# Patient Record
Sex: FEMALE | Race: WHITE | Employment: UNEMPLOYED | URBAN - NONMETROPOLITAN AREA
[De-identification: names, ages, dates, MRNs, and addresses within clinical notes are randomized per-mention and may not be internally consistent; named-entity substitution may affect disease eponyms.]

---

## 2017-01-05 RX ORDER — FAMOTIDINE 20 MG/1
TABLET, FILM COATED ORAL
Qty: 180 TABLET | Refills: 1 | Status: SHIPPED | OUTPATIENT
Start: 2017-01-05 | End: 2017-04-24 | Stop reason: ALTCHOICE

## 2017-01-11 RX ORDER — DONEPEZIL HYDROCHLORIDE 5 MG/1
5 TABLET, FILM COATED ORAL NIGHTLY
Qty: 90 TABLET | Refills: 1 | Status: SHIPPED | OUTPATIENT
Start: 2017-01-11 | End: 2017-03-03 | Stop reason: SDUPTHER

## 2017-01-16 RX ORDER — MELOXICAM 15 MG/1
15 TABLET ORAL DAILY
Qty: 90 TABLET | Refills: 1 | Status: SHIPPED | OUTPATIENT
Start: 2017-01-16 | End: 2017-04-24 | Stop reason: ALTCHOICE

## 2017-03-01 ENCOUNTER — TELEPHONE (OUTPATIENT)
Dept: FAMILY MEDICINE CLINIC | Age: 82
End: 2017-03-01

## 2017-03-01 RX ORDER — LACTULOSE 10 G/15ML
20 SOLUTION ORAL 3 TIMES DAILY
COMMUNITY
End: 2017-03-01 | Stop reason: SDUPTHER

## 2017-03-01 RX ORDER — LACTULOSE 10 G/15ML
SOLUTION ORAL
Qty: 473 ML | Refills: 1 | Status: ON HOLD | OUTPATIENT
Start: 2017-03-01 | End: 2018-01-02 | Stop reason: HOSPADM

## 2017-03-03 ENCOUNTER — OFFICE VISIT (OUTPATIENT)
Dept: PRIMARY CARE CLINIC | Age: 82
End: 2017-03-03

## 2017-03-03 VITALS
DIASTOLIC BLOOD PRESSURE: 74 MMHG | BODY MASS INDEX: 23.52 KG/M2 | WEIGHT: 128.6 LBS | OXYGEN SATURATION: 98 % | TEMPERATURE: 98.1 F | SYSTOLIC BLOOD PRESSURE: 120 MMHG | HEART RATE: 54 BPM

## 2017-03-03 DIAGNOSIS — R00.1 BRADYCARDIA: ICD-10-CM

## 2017-03-03 DIAGNOSIS — K64.4 RESIDUAL HEMORRHOIDAL SKIN TAGS: ICD-10-CM

## 2017-03-03 DIAGNOSIS — F02.80 LATE ONSET ALZHEIMER'S DISEASE WITHOUT BEHAVIORAL DISTURBANCE (HCC): ICD-10-CM

## 2017-03-03 DIAGNOSIS — K59.01 SLOW TRANSIT CONSTIPATION: Primary | ICD-10-CM

## 2017-03-03 DIAGNOSIS — R41.0 CONFUSION: ICD-10-CM

## 2017-03-03 DIAGNOSIS — G30.1 LATE ONSET ALZHEIMER'S DISEASE WITHOUT BEHAVIORAL DISTURBANCE (HCC): ICD-10-CM

## 2017-03-03 LAB
-: ABNORMAL
AMORPHOUS: ABNORMAL
BACTERIA: ABNORMAL
BILIRUBIN URINE: NEGATIVE
CASTS UA: ABNORMAL /LPF (ref 0–2)
COLOR: ABNORMAL
COMMENT UA: ABNORMAL
CRYSTALS, UA: ABNORMAL /HPF
EPITHELIAL CELLS UA: >100 /HPF (ref 0–5)
GLUCOSE URINE: NEGATIVE
KETONES, URINE: NEGATIVE
LEUKOCYTE ESTERASE, URINE: ABNORMAL
MUCUS: ABNORMAL
NITRITE, URINE: NEGATIVE
OTHER OBSERVATIONS UA: ABNORMAL
PH UA: 5.5 (ref 5–6)
PROTEIN UA: ABNORMAL
RBC UA: ABNORMAL /HPF (ref 0–4)
RENAL EPITHELIAL, UA: ABNORMAL /HPF
SPECIFIC GRAVITY UA: 1.02 (ref 1.01–1.02)
TRICHOMONAS: ABNORMAL
TURBIDITY: ABNORMAL
URINE HGB: ABNORMAL
UROBILINOGEN, URINE: NORMAL
WBC UA: >100 /HPF (ref 0–4)
YEAST: ABNORMAL

## 2017-03-03 PROCEDURE — 1123F ACP DISCUSS/DSCN MKR DOCD: CPT | Performed by: FAMILY MEDICINE

## 2017-03-03 PROCEDURE — 81001 URINALYSIS AUTO W/SCOPE: CPT | Performed by: FAMILY MEDICINE

## 2017-03-03 PROCEDURE — G8420 CALC BMI NORM PARAMETERS: HCPCS | Performed by: FAMILY MEDICINE

## 2017-03-03 PROCEDURE — 1090F PRES/ABSN URINE INCON ASSESS: CPT | Performed by: FAMILY MEDICINE

## 2017-03-03 PROCEDURE — G8484 FLU IMMUNIZE NO ADMIN: HCPCS | Performed by: FAMILY MEDICINE

## 2017-03-03 PROCEDURE — G8598 ASA/ANTIPLAT THER USED: HCPCS | Performed by: FAMILY MEDICINE

## 2017-03-03 PROCEDURE — G8427 DOCREV CUR MEDS BY ELIG CLIN: HCPCS | Performed by: FAMILY MEDICINE

## 2017-03-03 PROCEDURE — 1036F TOBACCO NON-USER: CPT | Performed by: FAMILY MEDICINE

## 2017-03-03 PROCEDURE — 99214 OFFICE O/P EST MOD 30 MIN: CPT | Performed by: FAMILY MEDICINE

## 2017-03-03 PROCEDURE — 4040F PNEUMOC VAC/ADMIN/RCVD: CPT | Performed by: FAMILY MEDICINE

## 2017-03-03 RX ORDER — DONEPEZIL HYDROCHLORIDE 10 MG/1
10 TABLET, FILM COATED ORAL NIGHTLY
Qty: 90 TABLET | Refills: 0
Start: 2017-03-03 | End: 2017-03-07 | Stop reason: SDUPTHER

## 2017-03-03 RX ORDER — SULFAMETHOXAZOLE AND TRIMETHOPRIM 400; 80 MG/1; MG/1
1 TABLET ORAL 2 TIMES DAILY
Qty: 20 TABLET | Refills: 0 | Status: SHIPPED | OUTPATIENT
Start: 2017-03-03 | End: 2017-03-13

## 2017-03-03 ASSESSMENT — ENCOUNTER SYMPTOMS
CONSTIPATION: 1
EYES NEGATIVE: 1
ANAL BLEEDING: 1
ALLERGIC/IMMUNOLOGIC NEGATIVE: 1
RECTAL PAIN: 1
BLOOD IN STOOL: 1
RESPIRATORY NEGATIVE: 1

## 2017-03-07 RX ORDER — DONEPEZIL HYDROCHLORIDE 10 MG/1
10 TABLET, FILM COATED ORAL NIGHTLY
Qty: 90 TABLET | Refills: 1 | Status: SHIPPED | OUTPATIENT
Start: 2017-03-07 | End: 2017-08-29 | Stop reason: SDUPTHER

## 2017-03-21 ENCOUNTER — OFFICE VISIT (OUTPATIENT)
Dept: FAMILY MEDICINE CLINIC | Age: 82
End: 2017-03-21
Payer: MEDICARE

## 2017-03-21 VITALS
HEIGHT: 62 IN | WEIGHT: 124.6 LBS | SYSTOLIC BLOOD PRESSURE: 112 MMHG | DIASTOLIC BLOOD PRESSURE: 50 MMHG | BODY MASS INDEX: 22.93 KG/M2 | OXYGEN SATURATION: 97 % | HEART RATE: 52 BPM

## 2017-03-21 DIAGNOSIS — R11.0 NAUSEATED: ICD-10-CM

## 2017-03-21 DIAGNOSIS — N39.0 URINARY TRACT INFECTION WITHOUT HEMATURIA, SITE UNSPECIFIED: Primary | ICD-10-CM

## 2017-03-21 DIAGNOSIS — D50.9 MICROCYTIC HYPOCHROMIC ANEMIA: ICD-10-CM

## 2017-03-21 DIAGNOSIS — D64.9 ANEMIA, UNSPECIFIED TYPE: Primary | ICD-10-CM

## 2017-03-21 DIAGNOSIS — R53.83 OTHER FATIGUE: ICD-10-CM

## 2017-03-21 LAB
-: ABNORMAL
AMORPHOUS: ABNORMAL
BACTERIA: ABNORMAL
CASTS UA: ABNORMAL /LPF (ref 0–2)
CRYSTALS, UA: ABNORMAL /HPF
EPITHELIAL CELLS UA: ABNORMAL /HPF (ref 0–5)
MUCUS: ABNORMAL
OTHER OBSERVATIONS UA: ABNORMAL
RBC UA: ABNORMAL /HPF (ref 0–4)
RENAL EPITHELIAL, UA: ABNORMAL /HPF
TRICHOMONAS: ABNORMAL
WBC UA: ABNORMAL /HPF (ref 0–4)
YEAST: ABNORMAL

## 2017-03-21 PROCEDURE — 1090F PRES/ABSN URINE INCON ASSESS: CPT | Performed by: NURSE PRACTITIONER

## 2017-03-21 PROCEDURE — G8419 CALC BMI OUT NRM PARAM NOF/U: HCPCS | Performed by: NURSE PRACTITIONER

## 2017-03-21 PROCEDURE — 1036F TOBACCO NON-USER: CPT | Performed by: NURSE PRACTITIONER

## 2017-03-21 PROCEDURE — 1123F ACP DISCUSS/DSCN MKR DOCD: CPT | Performed by: NURSE PRACTITIONER

## 2017-03-21 PROCEDURE — 99214 OFFICE O/P EST MOD 30 MIN: CPT | Performed by: NURSE PRACTITIONER

## 2017-03-21 PROCEDURE — G8427 DOCREV CUR MEDS BY ELIG CLIN: HCPCS | Performed by: NURSE PRACTITIONER

## 2017-03-21 PROCEDURE — G8484 FLU IMMUNIZE NO ADMIN: HCPCS | Performed by: NURSE PRACTITIONER

## 2017-03-21 PROCEDURE — G8598 ASA/ANTIPLAT THER USED: HCPCS | Performed by: NURSE PRACTITIONER

## 2017-03-21 PROCEDURE — 4040F PNEUMOC VAC/ADMIN/RCVD: CPT | Performed by: NURSE PRACTITIONER

## 2017-03-21 RX ORDER — NITROFURANTOIN 25; 75 MG/1; MG/1
100 CAPSULE ORAL 2 TIMES DAILY
Qty: 20 CAPSULE | Refills: 0 | Status: SHIPPED | OUTPATIENT
Start: 2017-03-21 | End: 2017-03-31

## 2017-03-21 RX ORDER — SUCRALFATE ORAL 1 G/10ML
1 SUSPENSION ORAL 4 TIMES DAILY
Qty: 1200 ML | Refills: 3 | Status: SHIPPED | OUTPATIENT
Start: 2017-03-21 | End: 2017-04-24 | Stop reason: ALTCHOICE

## 2017-03-21 ASSESSMENT — ENCOUNTER SYMPTOMS
VOMITING: 0
BLOOD IN STOOL: 0
TROUBLE SWALLOWING: 0
DIARRHEA: 0
SHORTNESS OF BREATH: 0
RESPIRATORY NEGATIVE: 1
CHEST TIGHTNESS: 0
GASTROINTESTINAL NEGATIVE: 1
EYES NEGATIVE: 1
CONSTIPATION: 0
ALLERGIC/IMMUNOLOGIC NEGATIVE: 1
COUGH: 0
NAUSEA: 0
SINUS PRESSURE: 0
ABDOMINAL PAIN: 0

## 2017-03-22 ENCOUNTER — TELEPHONE (OUTPATIENT)
Dept: FAMILY MEDICINE CLINIC | Age: 82
End: 2017-03-22

## 2017-04-24 ENCOUNTER — OFFICE VISIT (OUTPATIENT)
Dept: FAMILY MEDICINE CLINIC | Age: 82
End: 2017-04-24
Payer: MEDICARE

## 2017-04-24 VITALS
HEART RATE: 56 BPM | RESPIRATION RATE: 12 BRPM | SYSTOLIC BLOOD PRESSURE: 116 MMHG | HEIGHT: 62 IN | BODY MASS INDEX: 23.22 KG/M2 | DIASTOLIC BLOOD PRESSURE: 54 MMHG | WEIGHT: 126.2 LBS

## 2017-04-24 DIAGNOSIS — D64.9 ANEMIA, UNSPECIFIED TYPE: Primary | ICD-10-CM

## 2017-04-24 DIAGNOSIS — K57.91 GASTROINTESTINAL HEMORRHAGE ASSOCIATED WITH INTESTINAL DIVERTICULOSIS: ICD-10-CM

## 2017-04-24 DIAGNOSIS — Z23 NEED FOR PNEUMOCOCCAL VACCINATION: ICD-10-CM

## 2017-04-24 DIAGNOSIS — I48.0 PAROXYSMAL ATRIAL FIBRILLATION (HCC): ICD-10-CM

## 2017-04-24 PROCEDURE — 99496 TRANSJ CARE MGMT HIGH F2F 7D: CPT | Performed by: FAMILY MEDICINE

## 2017-04-24 RX ORDER — FERROUS SULFATE 325(65) MG
325 TABLET ORAL 2 TIMES DAILY
COMMUNITY

## 2017-04-24 RX ORDER — PANTOPRAZOLE SODIUM 40 MG/1
40 TABLET, DELAYED RELEASE ORAL DAILY
COMMUNITY
Start: 2017-04-21 | End: 2017-05-25 | Stop reason: SDUPTHER

## 2017-04-24 ASSESSMENT — ENCOUNTER SYMPTOMS
ABDOMINAL PAIN: 1
ANAL BLEEDING: 1
ALLERGIC/IMMUNOLOGIC NEGATIVE: 1
RECTAL PAIN: 0
BLOOD IN STOOL: 1
RESPIRATORY NEGATIVE: 1
EYES NEGATIVE: 1
CONSTIPATION: 1

## 2017-05-09 ENCOUNTER — TELEPHONE (OUTPATIENT)
Dept: FAMILY MEDICINE CLINIC | Age: 82
End: 2017-05-09
Payer: MEDICARE

## 2017-05-09 DIAGNOSIS — I63.9 CEREBROVASCULAR ACCIDENT (CVA), UNSPECIFIED MECHANISM (HCC): ICD-10-CM

## 2017-05-09 DIAGNOSIS — I48.20 CHRONIC ATRIAL FIBRILLATION (HCC): ICD-10-CM

## 2017-05-09 DIAGNOSIS — N28.9 RENAL DISEASE: ICD-10-CM

## 2017-05-09 DIAGNOSIS — I10 ESSENTIAL HYPERTENSION: ICD-10-CM

## 2017-05-09 DIAGNOSIS — E11.9 TYPE 2 DIABETES MELLITUS WITHOUT COMPLICATION, WITHOUT LONG-TERM CURRENT USE OF INSULIN (HCC): ICD-10-CM

## 2017-05-09 DIAGNOSIS — F02.80 LATE ONSET ALZHEIMER'S DISEASE WITHOUT BEHAVIORAL DISTURBANCE (HCC): Primary | ICD-10-CM

## 2017-05-09 DIAGNOSIS — G30.1 LATE ONSET ALZHEIMER'S DISEASE WITHOUT BEHAVIORAL DISTURBANCE (HCC): Primary | ICD-10-CM

## 2017-05-09 DIAGNOSIS — I25.119 CORONARY ARTERY DISEASE INVOLVING NATIVE HEART WITH ANGINA PECTORIS, UNSPECIFIED VESSEL OR LESION TYPE (HCC): ICD-10-CM

## 2017-05-09 PROCEDURE — G0180 MD CERTIFICATION HHA PATIENT: HCPCS | Performed by: FAMILY MEDICINE

## 2017-05-25 RX ORDER — PANTOPRAZOLE SODIUM 40 MG/1
TABLET, DELAYED RELEASE ORAL
Qty: 30 TABLET | Refills: 5 | Status: SHIPPED | OUTPATIENT
Start: 2017-05-25 | End: 2017-11-20 | Stop reason: SDUPTHER

## 2017-06-21 ENCOUNTER — NURSE ONLY (OUTPATIENT)
Dept: LAB | Age: 82
End: 2017-06-21
Payer: MEDICARE

## 2017-06-21 ENCOUNTER — OFFICE VISIT (OUTPATIENT)
Dept: FAMILY MEDICINE CLINIC | Age: 82
End: 2017-06-21
Payer: MEDICARE

## 2017-06-21 VITALS
RESPIRATION RATE: 16 BRPM | HEIGHT: 62 IN | WEIGHT: 122.6 LBS | BODY MASS INDEX: 22.56 KG/M2 | HEART RATE: 48 BPM | SYSTOLIC BLOOD PRESSURE: 132 MMHG | DIASTOLIC BLOOD PRESSURE: 58 MMHG

## 2017-06-21 DIAGNOSIS — N28.9 RENAL DISEASE: ICD-10-CM

## 2017-06-21 DIAGNOSIS — E78.00 PURE HYPERCHOLESTEROLEMIA: ICD-10-CM

## 2017-06-21 DIAGNOSIS — E11.9 TYPE 2 DIABETES MELLITUS WITHOUT COMPLICATION, WITHOUT LONG-TERM CURRENT USE OF INSULIN (HCC): ICD-10-CM

## 2017-06-21 DIAGNOSIS — G30.1 LATE ONSET ALZHEIMER'S DISEASE WITHOUT BEHAVIORAL DISTURBANCE (HCC): ICD-10-CM

## 2017-06-21 DIAGNOSIS — F02.80 LATE ONSET ALZHEIMER'S DISEASE WITHOUT BEHAVIORAL DISTURBANCE (HCC): ICD-10-CM

## 2017-06-21 DIAGNOSIS — K59.04 CHRONIC IDIOPATHIC CONSTIPATION: ICD-10-CM

## 2017-06-21 DIAGNOSIS — I10 ESSENTIAL HYPERTENSION: ICD-10-CM

## 2017-06-21 DIAGNOSIS — Z23 NEED FOR PNEUMOCOCCAL VACCINATION: Primary | ICD-10-CM

## 2017-06-21 DIAGNOSIS — I95.9 HYPOTENSIVE EPISODE: ICD-10-CM

## 2017-06-21 DIAGNOSIS — D64.9 ANEMIA, UNSPECIFIED TYPE: ICD-10-CM

## 2017-06-21 DIAGNOSIS — I25.119 CORONARY ARTERY DISEASE INVOLVING NATIVE HEART WITH ANGINA PECTORIS, UNSPECIFIED VESSEL OR LESION TYPE (HCC): ICD-10-CM

## 2017-06-21 DIAGNOSIS — I48.0 PAROXYSMAL ATRIAL FIBRILLATION (HCC): ICD-10-CM

## 2017-06-21 DIAGNOSIS — R00.1 BRADYCARDIA: ICD-10-CM

## 2017-06-21 DIAGNOSIS — Z23 NEED FOR PNEUMOCOCCAL VACCINATION: ICD-10-CM

## 2017-06-21 PROCEDURE — 99214 OFFICE O/P EST MOD 30 MIN: CPT | Performed by: FAMILY MEDICINE

## 2017-06-21 PROCEDURE — G8420 CALC BMI NORM PARAMETERS: HCPCS | Performed by: FAMILY MEDICINE

## 2017-06-21 PROCEDURE — G0009 ADMIN PNEUMOCOCCAL VACCINE: HCPCS | Performed by: FAMILY MEDICINE

## 2017-06-21 PROCEDURE — G8427 DOCREV CUR MEDS BY ELIG CLIN: HCPCS | Performed by: FAMILY MEDICINE

## 2017-06-21 PROCEDURE — 1036F TOBACCO NON-USER: CPT | Performed by: FAMILY MEDICINE

## 2017-06-21 PROCEDURE — G8598 ASA/ANTIPLAT THER USED: HCPCS | Performed by: FAMILY MEDICINE

## 2017-06-21 PROCEDURE — 4040F PNEUMOC VAC/ADMIN/RCVD: CPT | Performed by: FAMILY MEDICINE

## 2017-06-21 PROCEDURE — 1123F ACP DISCUSS/DSCN MKR DOCD: CPT | Performed by: FAMILY MEDICINE

## 2017-06-21 PROCEDURE — 90732 PPSV23 VACC 2 YRS+ SUBQ/IM: CPT | Performed by: FAMILY MEDICINE

## 2017-06-21 PROCEDURE — 1090F PRES/ABSN URINE INCON ASSESS: CPT | Performed by: FAMILY MEDICINE

## 2017-06-21 RX ORDER — PYRIDOSTIGMINE BROMIDE 60 MG/1
30 TABLET ORAL 2 TIMES DAILY
COMMUNITY
Start: 2017-06-12

## 2017-06-21 ASSESSMENT — PATIENT HEALTH QUESTIONNAIRE - PHQ9
2. FEELING DOWN, DEPRESSED OR HOPELESS: 1
SUM OF ALL RESPONSES TO PHQ QUESTIONS 1-9: 1
SUM OF ALL RESPONSES TO PHQ9 QUESTIONS 1 & 2: 1
1. LITTLE INTEREST OR PLEASURE IN DOING THINGS: 0

## 2017-06-21 ASSESSMENT — ENCOUNTER SYMPTOMS
EYES NEGATIVE: 1
ALLERGIC/IMMUNOLOGIC NEGATIVE: 1
CONSTIPATION: 1
COUGH: 0
RESPIRATORY NEGATIVE: 1

## 2017-07-28 ENCOUNTER — HOSPITAL ENCOUNTER (EMERGENCY)
Age: 82
Discharge: HOME | End: 2017-07-28
Attending: EMERGENCY MEDICINE
Payer: MEDICARE

## 2017-07-28 ENCOUNTER — TELEPHONE (OUTPATIENT)
Dept: PRIMARY CARE CLINIC | Age: 82
End: 2017-07-28

## 2017-07-28 VITALS
WEIGHT: 124 LBS | HEIGHT: 62 IN | DIASTOLIC BLOOD PRESSURE: 74 MMHG | RESPIRATION RATE: 12 BRPM | SYSTOLIC BLOOD PRESSURE: 170 MMHG | OXYGEN SATURATION: 95 % | BODY MASS INDEX: 22.82 KG/M2 | TEMPERATURE: 98.2 F | HEART RATE: 65 BPM

## 2017-07-28 DIAGNOSIS — R53.83 FATIGUE, UNSPECIFIED TYPE: ICD-10-CM

## 2017-07-28 DIAGNOSIS — N30.00 ACUTE CYSTITIS WITHOUT HEMATURIA: Primary | ICD-10-CM

## 2017-07-28 LAB
-: ABNORMAL
AMORPHOUS: ABNORMAL
ANION GAP SERPL CALCULATED.3IONS-SCNC: 11 MMOL/L (ref 9–17)
BACTERIA: ABNORMAL
BILIRUBIN URINE: NEGATIVE
BUN BLDV-MCNC: 16 MG/DL (ref 8–23)
BUN/CREAT BLD: 15 (ref 9–20)
CALCIUM SERPL-MCNC: 9.4 MG/DL (ref 8.6–10.4)
CASTS UA: ABNORMAL /LPF (ref 0–2)
CHLORIDE BLD-SCNC: 105 MMOL/L (ref 98–107)
CO2: 26 MMOL/L (ref 20–31)
COLOR: ABNORMAL
COMMENT UA: ABNORMAL
CREAT SERPL-MCNC: 1.06 MG/DL (ref 0.5–0.9)
CRYSTALS, UA: ABNORMAL /HPF
EPITHELIAL CELLS UA: ABNORMAL /HPF (ref 0–5)
GFR AFRICAN AMERICAN: 59 ML/MIN
GFR NON-AFRICAN AMERICAN: 49 ML/MIN
GFR SERPL CREATININE-BSD FRML MDRD: ABNORMAL ML/MIN/{1.73_M2}
GFR SERPL CREATININE-BSD FRML MDRD: ABNORMAL ML/MIN/{1.73_M2}
GLUCOSE BLD-MCNC: 187 MG/DL (ref 70–99)
GLUCOSE URINE: NEGATIVE
KETONES, URINE: NEGATIVE
LEUKOCYTE ESTERASE, URINE: ABNORMAL
MUCUS: ABNORMAL
NITRITE, URINE: NEGATIVE
OTHER OBSERVATIONS UA: ABNORMAL
PH UA: 6.5 (ref 5–6)
POTASSIUM SERPL-SCNC: 4.2 MMOL/L (ref 3.7–5.3)
PROTEIN UA: ABNORMAL
RBC UA: ABNORMAL /HPF (ref 0–4)
RENAL EPITHELIAL, UA: ABNORMAL /HPF
SODIUM BLD-SCNC: 142 MMOL/L (ref 135–144)
SPECIFIC GRAVITY UA: 1.01 (ref 1.01–1.02)
THYROXINE, FREE: 1.56 NG/DL (ref 0.93–1.7)
TRICHOMONAS: ABNORMAL
TSH SERPL DL<=0.05 MIU/L-ACNC: 0.66 MIU/L (ref 0.3–5)
TURBIDITY: ABNORMAL
URINE HGB: NEGATIVE
UROBILINOGEN, URINE: NORMAL
WBC UA: ABNORMAL /HPF (ref 0–4)
YEAST: ABNORMAL

## 2017-07-28 PROCEDURE — 80048 BASIC METABOLIC PNL TOTAL CA: CPT

## 2017-07-28 PROCEDURE — 84439 ASSAY OF FREE THYROXINE: CPT

## 2017-07-28 PROCEDURE — 99283 EMERGENCY DEPT VISIT LOW MDM: CPT

## 2017-07-28 PROCEDURE — 84443 ASSAY THYROID STIM HORMONE: CPT

## 2017-07-28 PROCEDURE — 36415 COLL VENOUS BLD VENIPUNCTURE: CPT

## 2017-07-28 PROCEDURE — 81001 URINALYSIS AUTO W/SCOPE: CPT

## 2017-07-28 RX ORDER — SULFAMETHOXAZOLE AND TRIMETHOPRIM 800; 160 MG/1; MG/1
1 TABLET ORAL 2 TIMES DAILY
Qty: 20 TABLET | Refills: 0 | Status: SHIPPED | OUTPATIENT
Start: 2017-07-28 | End: 2017-08-07

## 2017-08-01 ENCOUNTER — CARE COORDINATION (OUTPATIENT)
Dept: CARE COORDINATION | Age: 82
End: 2017-08-01

## 2017-08-04 ENCOUNTER — OFFICE VISIT (OUTPATIENT)
Dept: PRIMARY CARE CLINIC | Age: 82
End: 2017-08-04
Payer: MEDICARE

## 2017-08-04 VITALS
OXYGEN SATURATION: 99 % | TEMPERATURE: 98.3 F | DIASTOLIC BLOOD PRESSURE: 64 MMHG | HEART RATE: 54 BPM | SYSTOLIC BLOOD PRESSURE: 112 MMHG

## 2017-08-04 DIAGNOSIS — R30.0 DYSURIA: Primary | ICD-10-CM

## 2017-08-04 DIAGNOSIS — M25.552 PAIN OF LEFT HIP JOINT: ICD-10-CM

## 2017-08-04 DIAGNOSIS — Z91.81 AT HIGH RISK FOR FALLS: ICD-10-CM

## 2017-08-04 DIAGNOSIS — R29.898 WEAKNESS OF BOTH LEGS: ICD-10-CM

## 2017-08-04 DIAGNOSIS — G30.1 LATE ONSET ALZHEIMER'S DISEASE WITHOUT BEHAVIORAL DISTURBANCE (HCC): ICD-10-CM

## 2017-08-04 DIAGNOSIS — F02.80 LATE ONSET ALZHEIMER'S DISEASE WITHOUT BEHAVIORAL DISTURBANCE (HCC): ICD-10-CM

## 2017-08-04 LAB
-: ABNORMAL
AMORPHOUS: ABNORMAL
BACTERIA: ABNORMAL
BILIRUBIN URINE: NEGATIVE
CASTS UA: ABNORMAL /LPF (ref 0–2)
COLOR: ABNORMAL
COMMENT UA: ABNORMAL
CRYSTALS, UA: ABNORMAL /HPF
EPITHELIAL CELLS UA: ABNORMAL /HPF (ref 0–5)
GLUCOSE URINE: NEGATIVE
KETONES, URINE: NEGATIVE
LEUKOCYTE ESTERASE, URINE: ABNORMAL
MUCUS: ABNORMAL
NITRITE, URINE: NEGATIVE
OTHER OBSERVATIONS UA: ABNORMAL
PH UA: 6 (ref 5–6)
PROTEIN UA: NEGATIVE
RBC UA: ABNORMAL /HPF (ref 0–4)
RENAL EPITHELIAL, UA: ABNORMAL /HPF
SPECIFIC GRAVITY UA: 1.03 (ref 1.01–1.02)
TRICHOMONAS: ABNORMAL
TURBIDITY: ABNORMAL
URINE HGB: ABNORMAL
UROBILINOGEN, URINE: NORMAL
WBC UA: ABNORMAL /HPF (ref 0–4)
YEAST: ABNORMAL

## 2017-08-04 PROCEDURE — G8598 ASA/ANTIPLAT THER USED: HCPCS | Performed by: FAMILY MEDICINE

## 2017-08-04 PROCEDURE — 1090F PRES/ABSN URINE INCON ASSESS: CPT | Performed by: FAMILY MEDICINE

## 2017-08-04 PROCEDURE — 81003 URINALYSIS AUTO W/O SCOPE: CPT | Performed by: FAMILY MEDICINE

## 2017-08-04 PROCEDURE — 4040F PNEUMOC VAC/ADMIN/RCVD: CPT | Performed by: FAMILY MEDICINE

## 2017-08-04 PROCEDURE — 1123F ACP DISCUSS/DSCN MKR DOCD: CPT | Performed by: FAMILY MEDICINE

## 2017-08-04 PROCEDURE — 1036F TOBACCO NON-USER: CPT | Performed by: FAMILY MEDICINE

## 2017-08-04 PROCEDURE — 99214 OFFICE O/P EST MOD 30 MIN: CPT | Performed by: FAMILY MEDICINE

## 2017-08-04 PROCEDURE — G8427 DOCREV CUR MEDS BY ELIG CLIN: HCPCS | Performed by: FAMILY MEDICINE

## 2017-08-04 PROCEDURE — G8420 CALC BMI NORM PARAMETERS: HCPCS | Performed by: FAMILY MEDICINE

## 2017-08-04 PROCEDURE — 81001 URINALYSIS AUTO W/SCOPE: CPT | Performed by: FAMILY MEDICINE

## 2017-08-04 ASSESSMENT — ENCOUNTER SYMPTOMS
RESPIRATORY NEGATIVE: 1
ALLERGIC/IMMUNOLOGIC NEGATIVE: 1
CONSTIPATION: 1
EYES NEGATIVE: 1
COUGH: 0

## 2017-08-08 ENCOUNTER — TELEPHONE (OUTPATIENT)
Dept: FAMILY MEDICINE CLINIC | Age: 82
End: 2017-08-08

## 2017-08-09 ENCOUNTER — NURSE ONLY (OUTPATIENT)
Dept: LAB | Age: 82
End: 2017-08-09
Payer: MEDICARE

## 2017-08-09 DIAGNOSIS — N39.0 URINARY TRACT INFECTION WITHOUT HEMATURIA, SITE UNSPECIFIED: Primary | ICD-10-CM

## 2017-08-09 DIAGNOSIS — N39.0 URINARY TRACT INFECTION WITHOUT HEMATURIA, SITE UNSPECIFIED: ICD-10-CM

## 2017-08-09 LAB
-: ABNORMAL
AMORPHOUS: ABNORMAL
BACTERIA: ABNORMAL
BILIRUBIN URINE: NEGATIVE
CASTS UA: ABNORMAL /LPF (ref 0–2)
COLOR: ABNORMAL
COMMENT UA: ABNORMAL
CRYSTALS, UA: >100 /HPF
EPITHELIAL CELLS UA: ABNORMAL /HPF (ref 0–5)
GLUCOSE URINE: NEGATIVE
KETONES, URINE: NEGATIVE
LEUKOCYTE ESTERASE, URINE: ABNORMAL
MUCUS: ABNORMAL
NITRITE, URINE: NEGATIVE
OTHER OBSERVATIONS UA: ABNORMAL
PH UA: 6 (ref 5–6)
PROTEIN UA: NEGATIVE
RBC UA: ABNORMAL /HPF (ref 0–4)
RENAL EPITHELIAL, UA: ABNORMAL /HPF
SPECIFIC GRAVITY UA: 1.01 (ref 1.01–1.02)
TRICHOMONAS: ABNORMAL
TURBIDITY: ABNORMAL
URINE HGB: NEGATIVE
UROBILINOGEN, URINE: NORMAL
WBC UA: ABNORMAL /HPF (ref 0–4)
YEAST: ABNORMAL

## 2017-08-09 PROCEDURE — 81003 URINALYSIS AUTO W/O SCOPE: CPT | Performed by: FAMILY MEDICINE

## 2017-08-09 PROCEDURE — 81001 URINALYSIS AUTO W/SCOPE: CPT | Performed by: FAMILY MEDICINE

## 2017-08-29 RX ORDER — DONEPEZIL HYDROCHLORIDE 10 MG/1
TABLET, FILM COATED ORAL
Qty: 90 TABLET | Refills: 3 | Status: SHIPPED | OUTPATIENT
Start: 2017-08-29

## 2017-09-28 ENCOUNTER — TELEPHONE (OUTPATIENT)
Dept: FAMILY MEDICINE CLINIC | Age: 82
End: 2017-09-28
Payer: MEDICARE

## 2017-09-28 DIAGNOSIS — I63.9 CEREBROVASCULAR ACCIDENT (CVA), UNSPECIFIED MECHANISM (HCC): ICD-10-CM

## 2017-09-28 DIAGNOSIS — G30.1 LATE ONSET ALZHEIMER'S DISEASE WITHOUT BEHAVIORAL DISTURBANCE (HCC): Primary | ICD-10-CM

## 2017-09-28 DIAGNOSIS — N28.9 RENAL DISEASE: ICD-10-CM

## 2017-09-28 DIAGNOSIS — F02.80 LATE ONSET ALZHEIMER'S DISEASE WITHOUT BEHAVIORAL DISTURBANCE (HCC): Primary | ICD-10-CM

## 2017-09-28 DIAGNOSIS — I48.20 CHRONIC ATRIAL FIBRILLATION (HCC): ICD-10-CM

## 2017-09-28 DIAGNOSIS — I25.119 CORONARY ARTERY DISEASE INVOLVING NATIVE CORONARY ARTERY OF NATIVE HEART WITH ANGINA PECTORIS (HCC): ICD-10-CM

## 2017-09-28 PROCEDURE — G0180 MD CERTIFICATION HHA PATIENT: HCPCS | Performed by: FAMILY MEDICINE

## 2017-11-20 RX ORDER — PANTOPRAZOLE SODIUM 40 MG/1
TABLET, DELAYED RELEASE ORAL
Qty: 30 TABLET | Refills: 0 | Status: SHIPPED | OUTPATIENT
Start: 2017-11-20 | End: 2017-12-22 | Stop reason: SDUPTHER

## 2017-11-29 ENCOUNTER — HOSPITAL ENCOUNTER (OUTPATIENT)
Age: 82
Setting detail: SPECIMEN
Discharge: HOME | End: 2017-11-29
Payer: MEDICARE

## 2017-11-29 ENCOUNTER — OFFICE VISIT (OUTPATIENT)
Dept: PRIMARY CARE CLINIC | Age: 82
End: 2017-11-29
Payer: MEDICARE

## 2017-11-29 VITALS
SYSTOLIC BLOOD PRESSURE: 138 MMHG | TEMPERATURE: 97.4 F | DIASTOLIC BLOOD PRESSURE: 84 MMHG | HEIGHT: 62 IN | WEIGHT: 123.9 LBS | HEART RATE: 68 BPM | BODY MASS INDEX: 22.8 KG/M2

## 2017-11-29 DIAGNOSIS — R30.0 DYSURIA: Primary | ICD-10-CM

## 2017-11-29 DIAGNOSIS — N30.00 ACUTE CYSTITIS WITHOUT HEMATURIA: ICD-10-CM

## 2017-11-29 DIAGNOSIS — R30.0 DYSURIA: ICD-10-CM

## 2017-11-29 LAB
-: ABNORMAL
AMORPHOUS: ABNORMAL
BACTERIA: ABNORMAL
BILIRUBIN URINE: NEGATIVE
CASTS UA: >50 /LPF (ref 0–2)
COLOR: ABNORMAL
COMMENT UA: ABNORMAL
CRYSTALS, UA: ABNORMAL /HPF
EPITHELIAL CELLS UA: ABNORMAL /HPF (ref 0–5)
GLUCOSE URINE: ABNORMAL
KETONES, URINE: ABNORMAL
LEUKOCYTE ESTERASE, URINE: ABNORMAL
MUCUS: ABNORMAL
NITRITE, URINE: POSITIVE
OTHER OBSERVATIONS UA: ABNORMAL
PH UA: 5 (ref 5–6)
PROTEIN UA: ABNORMAL
RBC UA: ABNORMAL /HPF (ref 0–4)
RENAL EPITHELIAL, UA: ABNORMAL /HPF
SPECIFIC GRAVITY UA: 1.02 (ref 1.01–1.02)
TRICHOMONAS: ABNORMAL
TURBIDITY: ABNORMAL
URINE HGB: ABNORMAL
UROBILINOGEN, URINE: ABNORMAL
WBC UA: ABNORMAL /HPF (ref 0–4)
YEAST: ABNORMAL

## 2017-11-29 PROCEDURE — 1123F ACP DISCUSS/DSCN MKR DOCD: CPT | Performed by: FAMILY MEDICINE

## 2017-11-29 PROCEDURE — G8598 ASA/ANTIPLAT THER USED: HCPCS | Performed by: FAMILY MEDICINE

## 2017-11-29 PROCEDURE — 87086 URINE CULTURE/COLONY COUNT: CPT

## 2017-11-29 PROCEDURE — G8420 CALC BMI NORM PARAMETERS: HCPCS | Performed by: FAMILY MEDICINE

## 2017-11-29 PROCEDURE — 1090F PRES/ABSN URINE INCON ASSESS: CPT | Performed by: FAMILY MEDICINE

## 2017-11-29 PROCEDURE — 99213 OFFICE O/P EST LOW 20 MIN: CPT | Performed by: FAMILY MEDICINE

## 2017-11-29 PROCEDURE — 81001 URINALYSIS AUTO W/SCOPE: CPT

## 2017-11-29 PROCEDURE — 4040F PNEUMOC VAC/ADMIN/RCVD: CPT | Performed by: FAMILY MEDICINE

## 2017-11-29 PROCEDURE — G8427 DOCREV CUR MEDS BY ELIG CLIN: HCPCS | Performed by: FAMILY MEDICINE

## 2017-11-29 PROCEDURE — 1036F TOBACCO NON-USER: CPT | Performed by: FAMILY MEDICINE

## 2017-11-29 PROCEDURE — G8484 FLU IMMUNIZE NO ADMIN: HCPCS | Performed by: FAMILY MEDICINE

## 2017-11-29 RX ORDER — SULFAMETHOXAZOLE AND TRIMETHOPRIM 400; 80 MG/1; MG/1
1 TABLET ORAL DAILY
Qty: 7 TABLET | Refills: 0 | Status: SHIPPED | OUTPATIENT
Start: 2017-11-29 | End: 2017-12-06

## 2017-11-29 ASSESSMENT — ENCOUNTER SYMPTOMS
NAUSEA: 0
EYES NEGATIVE: 1
CONSTIPATION: 0
ABDOMINAL PAIN: 1
ALLERGIC/IMMUNOLOGIC NEGATIVE: 1
DIARRHEA: 0
BACK PAIN: 0
RESPIRATORY NEGATIVE: 1
VOMITING: 0

## 2017-11-29 NOTE — PROGRESS NOTES
Subjective:      Patient ID: Blair Stephen is a 80 y.o. female. HPI acute urgent care visit for some slight confusion and episode where she became acutely weak today. She was shopping this afternoon with her dtr. She did well , pushing a cart. She was walking in a store and she suddenly \"melted\" per her dtr. Her dtr was able to catch her and help slowly to the floor. They were able to get her on a bench and used a wheelchair to get back to the car. No loc. She seemed to be a little confused earlier in the day, and again around the time of this near fall. She has had 3 prior episodes over the last few years with similar issue with legs suddenly loosing strength. Last episode was during a uti. Similar symptoms. She has no fever. No dysuria. She currently relates right lower abdominal pain \"when she needs to have a bm\", then pain improves after. Feeling ok at present. Seems back to her baseline.     Past Medical History:   Diagnosis Date    Adenocarcinoma ([facility]     history of focal adenocarcinoma atypical hyperplasia     Anemia     Atrophic vaginitis     history of     Chronic atrial fibrillation (HCC)     Coronary artery disease     Dementia     Diabetes mellitus, type 2 (HCC)     Diverticulosis     Fatigue     GERD (gastroesophageal reflux disease)     Heartburn     Herpes zoster     history of     Hypercholesterolemia     Hypertension     Hypotensive episode     history of     Left cataract     Orthostasis     Osteoporosis     Renal disease     chronic stage 3    Stroke ([address]     Vasomotor rhinitis     history of     Vertigo     history of      Past Surgical History:   Procedure Laterality Date    CATARACT REMOVAL WITH IMPLANT Left      SECTION      COLONOSCOPY  2006    CYST REMOVAL  1979    back    DILATION AND CURETTAGE OF UTERUS  1977    MMR    OTHER SURGICAL HISTORY      CABG x 4     OTHER SURGICAL HISTORY  10/24/2012    removal of NORM Final    Nitrite, Urine 11/29/2017 POSITIVE* NEG Final    Leukocyte Esterase, Urine 11/29/2017 1+* NEG Final    Urinalysis Comments 11/29/2017 Results may be affected due to urine color interference. Final    - 11/29/2017        Final    WBC, UA 11/29/2017 10 TO 25  0 - 4 /HPF Final    RBC, UA 11/29/2017 10 TO 25  0 - 4 /HPF Final    Casts UA 11/29/2017 >50  0 - 2 /LPF Final    Crystals UA 11/29/2017 NOT REPORTED  NONE /HPF Final    Epithelial Cells UA 11/29/2017 25 TO 50  0 - 5 /HPF Final    Renal Epithelial, Urine 11/29/2017 NOT REPORTED  0 /HPF Final    Bacteria, UA 11/29/2017 2+* NONE Final    Mucus, UA 11/29/2017 NOT REPORTED  NONE Final    Trichomonas, UA 11/29/2017 NOT REPORTED  NONE Final    Amorphous, UA 11/29/2017 NOT REPORTED  NONE Final    Other Observations UA 11/29/2017 Specimen Cultured* NREQ Final    Yeast, UA 11/29/2017 NOT REPORTED  NONE Final       Assessment:      Acute collapse. Controled fall to the ground without injury. uti recurrent. Similar sequence of events in the past with uti      Plan:      Starting bactrim ss bid x 7 days  Increase fluids  Supervised ambulation and bathroom visits for the next 2 days or so to avoid falls  Call with any concern, fever or other symptoms.

## 2017-12-01 LAB
CULTURE: NORMAL
CULTURE: NORMAL
Lab: NORMAL
SPECIMEN DESCRIPTION: NORMAL
SPECIMEN DESCRIPTION: NORMAL
STATUS: NORMAL

## 2017-12-08 ENCOUNTER — TELEPHONE (OUTPATIENT)
Dept: FAMILY MEDICINE CLINIC | Age: 82
End: 2017-12-08

## 2017-12-08 RX ORDER — MECLIZINE HYDROCHLORIDE 25 MG/1
25 TABLET ORAL 3 TIMES DAILY PRN
Qty: 30 TABLET | Refills: 0 | Status: SHIPPED | OUTPATIENT
Start: 2017-12-08

## 2017-12-13 ENCOUNTER — HOSPITAL ENCOUNTER (OUTPATIENT)
Dept: LAB | Age: 82
Setting detail: SPECIMEN
Discharge: HOME | End: 2017-12-13
Payer: MEDICARE

## 2017-12-13 DIAGNOSIS — E11.9 TYPE 2 DIABETES MELLITUS WITHOUT COMPLICATION, WITHOUT LONG-TERM CURRENT USE OF INSULIN (HCC): ICD-10-CM

## 2017-12-13 DIAGNOSIS — I48.0 PAROXYSMAL ATRIAL FIBRILLATION (HCC): ICD-10-CM

## 2017-12-13 DIAGNOSIS — N30.00 ACUTE CYSTITIS WITHOUT HEMATURIA: ICD-10-CM

## 2017-12-13 DIAGNOSIS — I10 ESSENTIAL HYPERTENSION: ICD-10-CM

## 2017-12-13 LAB
-: ABNORMAL
ABSOLUTE EOS #: 0.22 K/UL (ref 0–0.4)
ABSOLUTE IMMATURE GRANULOCYTE: ABNORMAL K/UL (ref 0–0.3)
ABSOLUTE LYMPH #: 1.75 K/UL (ref 1–4.8)
ABSOLUTE MONO #: 0.51 K/UL (ref 0.1–1.2)
AMORPHOUS: ABNORMAL
ANION GAP SERPL CALCULATED.3IONS-SCNC: 11 MMOL/L (ref 9–17)
BACTERIA: ABNORMAL
BASOPHILS # BLD: 0 % (ref 0–1)
BASOPHILS ABSOLUTE: 0.02 K/UL (ref 0–0.2)
BILIRUBIN URINE: NEGATIVE
BUN BLDV-MCNC: 16 MG/DL (ref 8–23)
BUN/CREAT BLD: 15 (ref 9–20)
CALCIUM SERPL-MCNC: 9.5 MG/DL (ref 8.6–10.4)
CASTS UA: ABNORMAL /LPF (ref 0–2)
CHLORIDE BLD-SCNC: 109 MMOL/L (ref 98–107)
CO2: 25 MMOL/L (ref 20–31)
COLOR: ABNORMAL
COMMENT UA: ABNORMAL
CREAT SERPL-MCNC: 1.05 MG/DL (ref 0.5–0.9)
CREATININE URINE: 160.4 MG/DL (ref 28–217)
CRYSTALS, UA: ABNORMAL /HPF
DIFFERENTIAL TYPE: ABNORMAL
EOSINOPHILS RELATIVE PERCENT: 4 % (ref 1–7)
EPITHELIAL CELLS UA: ABNORMAL /HPF (ref 0–5)
ESTIMATED AVERAGE GLUCOSE: 88 MG/DL
GFR AFRICAN AMERICAN: 60 ML/MIN
GFR NON-AFRICAN AMERICAN: 49 ML/MIN
GFR SERPL CREATININE-BSD FRML MDRD: ABNORMAL ML/MIN/{1.73_M2}
GFR SERPL CREATININE-BSD FRML MDRD: ABNORMAL ML/MIN/{1.73_M2}
GLUCOSE BLD-MCNC: 143 MG/DL (ref 70–99)
GLUCOSE URINE: NEGATIVE
HBA1C MFR BLD: 4.7 % (ref 4.8–5.9)
HCT VFR BLD CALC: 32.8 % (ref 36–46)
HEMOGLOBIN: 10.5 G/DL (ref 12–16)
IMMATURE GRANULOCYTES: ABNORMAL %
KETONES, URINE: NEGATIVE
LEUKOCYTE ESTERASE, URINE: ABNORMAL
LYMPHOCYTES # BLD: 30 % (ref 16–46)
MCH RBC QN AUTO: 30.4 PG (ref 26–34)
MCHC RBC AUTO-ENTMCNC: 32.1 G/DL (ref 31–37)
MCV RBC AUTO: 94.7 FL (ref 80–100)
MICROALBUMIN/CREAT 24H UR: 288 MG/L
MICROALBUMIN/CREAT UR-RTO: 180 MCG/MG CREAT
MONOCYTES # BLD: 9 % (ref 4–11)
MUCUS: ABNORMAL
NITRITE, URINE: NEGATIVE
OTHER OBSERVATIONS UA: ABNORMAL
PDW BLD-RTO: 12 % (ref 11–14.5)
PH UA: 6 (ref 5–6)
PLATELET # BLD: 252 K/UL (ref 140–450)
PLATELET ESTIMATE: ABNORMAL
PMV BLD AUTO: 8.8 FL (ref 6–12)
POTASSIUM SERPL-SCNC: 4 MMOL/L (ref 3.7–5.3)
PROTEIN UA: ABNORMAL
RBC # BLD: 3.46 M/UL (ref 4–5.2)
RBC # BLD: ABNORMAL 10*6/UL
RBC UA: ABNORMAL /HPF (ref 0–4)
RENAL EPITHELIAL, UA: ABNORMAL /HPF
SEG NEUTROPHILS: 57 % (ref 43–77)
SEGMENTED NEUTROPHILS ABSOLUTE COUNT: 3.26 K/UL (ref 1.8–7.7)
SODIUM BLD-SCNC: 145 MMOL/L (ref 135–144)
SPECIFIC GRAVITY UA: 1.02 (ref 1.01–1.02)
THYROXINE, FREE: 1.39 NG/DL (ref 0.93–1.7)
TRICHOMONAS: ABNORMAL
TSH SERPL DL<=0.05 MIU/L-ACNC: 1.5 MIU/L (ref 0.3–5)
TURBIDITY: ABNORMAL
URINE HGB: NEGATIVE
UROBILINOGEN, URINE: NORMAL
WBC # BLD: 5.7 K/UL (ref 3.5–11)
WBC # BLD: ABNORMAL 10*3/UL
WBC UA: ABNORMAL /HPF (ref 0–4)
YEAST: ABNORMAL

## 2017-12-13 PROCEDURE — 84443 ASSAY THYROID STIM HORMONE: CPT

## 2017-12-13 PROCEDURE — 82570 ASSAY OF URINE CREATININE: CPT

## 2017-12-13 PROCEDURE — 82043 UR ALBUMIN QUANTITATIVE: CPT

## 2017-12-13 PROCEDURE — 36415 COLL VENOUS BLD VENIPUNCTURE: CPT

## 2017-12-13 PROCEDURE — 81001 URINALYSIS AUTO W/SCOPE: CPT

## 2017-12-13 PROCEDURE — 80048 BASIC METABOLIC PNL TOTAL CA: CPT

## 2017-12-13 PROCEDURE — 84439 ASSAY OF FREE THYROXINE: CPT

## 2017-12-13 PROCEDURE — 85025 COMPLETE CBC W/AUTO DIFF WBC: CPT

## 2017-12-13 PROCEDURE — 83036 HEMOGLOBIN GLYCOSYLATED A1C: CPT

## 2017-12-21 ENCOUNTER — OFFICE VISIT (OUTPATIENT)
Dept: FAMILY MEDICINE CLINIC | Age: 82
End: 2017-12-21
Payer: MEDICARE

## 2017-12-21 VITALS
SYSTOLIC BLOOD PRESSURE: 122 MMHG | HEIGHT: 62 IN | DIASTOLIC BLOOD PRESSURE: 70 MMHG | WEIGHT: 118 LBS | BODY MASS INDEX: 21.71 KG/M2

## 2017-12-21 DIAGNOSIS — R42 VERTIGO: ICD-10-CM

## 2017-12-21 DIAGNOSIS — E11.9 TYPE 2 DIABETES MELLITUS WITHOUT COMPLICATION, WITHOUT LONG-TERM CURRENT USE OF INSULIN (HCC): ICD-10-CM

## 2017-12-21 DIAGNOSIS — I25.119 CORONARY ARTERY DISEASE INVOLVING NATIVE CORONARY ARTERY OF NATIVE HEART WITH ANGINA PECTORIS (HCC): ICD-10-CM

## 2017-12-21 DIAGNOSIS — E78.00 PURE HYPERCHOLESTEROLEMIA: ICD-10-CM

## 2017-12-21 DIAGNOSIS — E55.9 VITAMIN D DEFICIENCY: ICD-10-CM

## 2017-12-21 DIAGNOSIS — N28.9 RENAL DISEASE: ICD-10-CM

## 2017-12-21 DIAGNOSIS — R53.83 OTHER FATIGUE: Primary | ICD-10-CM

## 2017-12-21 DIAGNOSIS — D64.9 ANEMIA, UNSPECIFIED TYPE: ICD-10-CM

## 2017-12-21 DIAGNOSIS — R00.1 BRADYCARDIA: ICD-10-CM

## 2017-12-21 DIAGNOSIS — I10 ESSENTIAL HYPERTENSION: ICD-10-CM

## 2017-12-21 DIAGNOSIS — N39.0 URINARY TRACT INFECTION WITHOUT HEMATURIA, SITE UNSPECIFIED: ICD-10-CM

## 2017-12-21 DIAGNOSIS — I48.20 CHRONIC ATRIAL FIBRILLATION (HCC): ICD-10-CM

## 2017-12-21 DIAGNOSIS — I25.119 CORONARY ARTERY DISEASE INVOLVING NATIVE HEART WITH ANGINA PECTORIS, UNSPECIFIED VESSEL OR LESION TYPE (HCC): ICD-10-CM

## 2017-12-21 DIAGNOSIS — F03.90 DEMENTIA WITHOUT BEHAVIORAL DISTURBANCE, UNSPECIFIED DEMENTIA TYPE: ICD-10-CM

## 2017-12-21 PROCEDURE — 1090F PRES/ABSN URINE INCON ASSESS: CPT | Performed by: FAMILY MEDICINE

## 2017-12-21 PROCEDURE — 4040F PNEUMOC VAC/ADMIN/RCVD: CPT | Performed by: FAMILY MEDICINE

## 2017-12-21 PROCEDURE — G8420 CALC BMI NORM PARAMETERS: HCPCS | Performed by: FAMILY MEDICINE

## 2017-12-21 PROCEDURE — G8427 DOCREV CUR MEDS BY ELIG CLIN: HCPCS | Performed by: FAMILY MEDICINE

## 2017-12-21 PROCEDURE — G8598 ASA/ANTIPLAT THER USED: HCPCS | Performed by: FAMILY MEDICINE

## 2017-12-21 PROCEDURE — 1036F TOBACCO NON-USER: CPT | Performed by: FAMILY MEDICINE

## 2017-12-21 PROCEDURE — 99214 OFFICE O/P EST MOD 30 MIN: CPT | Performed by: FAMILY MEDICINE

## 2017-12-21 PROCEDURE — 1123F ACP DISCUSS/DSCN MKR DOCD: CPT | Performed by: FAMILY MEDICINE

## 2017-12-21 PROCEDURE — G8484 FLU IMMUNIZE NO ADMIN: HCPCS | Performed by: FAMILY MEDICINE

## 2017-12-21 RX ORDER — SULFAMETHOXAZOLE AND TRIMETHOPRIM 800; 160 MG/1; MG/1
1 TABLET ORAL 2 TIMES DAILY
Qty: 20 TABLET | Refills: 0 | Status: ON HOLD | OUTPATIENT
Start: 2017-12-21 | End: 2017-12-28

## 2017-12-21 ASSESSMENT — ENCOUNTER SYMPTOMS
ALLERGIC/IMMUNOLOGIC NEGATIVE: 1
EYES NEGATIVE: 1
CONSTIPATION: 1
COUGH: 0
RESPIRATORY NEGATIVE: 1

## 2017-12-21 NOTE — PROGRESS NOTES
Subjective:      Patient ID: Sofie Sykes is a 80 y.o. female. Diabetes   Hypoglycemia symptoms include confusion, dizziness and pallor. Associated symptoms include fatigue (improved) and weakness (improved). Hypertension     Hyperlipidemia     Coronary Artery Disease   Symptoms include dizziness. Risk factors include hyperlipidemia. Gastroesophageal Reflux   She reports no coughing. Associated symptoms include fatigue (improved). Routine follow up on chronic medical conditions, refills, and review of updated labs. I have reviewed the patient's medical history in detail and updated the computerized patient record. She has been having continued episodes where she is more anxious and confused. Interval visit for mental status  And acute weakness changes, likely related to uti at the time. She had another bout of vertigo. Calling out in the morning and needing assistance. Poor balance. ED visit for same. CT of the head with no acute changes. bp elevated at that time. Given her doses of hctz and cozaar with reduction in bp towards normal. No interventions needed. She is sleeping more. Cognitive status fairly stable. Still having some vertigo last night acutely again. Hand coordination and strength seem worse at present. No gerd symptoms to report. No chest pain or palpitations. Her pocket where pacer removed is sore at times. Not checking bs at home recently. bp fair at present. Some 150 range at times. Increases with vertigo recurrence.      Past Medical History:   Diagnosis Date    Adenocarcinoma [facility])     history of focal adenocarcinoma atypical hyperplasia     Anemia     Atrophic vaginitis     history of     Chronic atrial fibrillation (HCC)     Coronary artery disease     Dementia     Diabetes mellitus, type 2 (HCC)     Diverticulosis     Fatigue     GERD (gastroesophageal reflux disease)     Heartburn     Herpes zoster     history of     Take 1,000 mg by mouth daily.  Multiple Vitamins-Minerals (MULTIVITAMIN PO) Take  by mouth daily.  Omega-3 Fatty Acids (FISH OIL PO) Take  by mouth daily.  meclizine (ANTIVERT) 25 MG tablet Take 1 tablet by mouth 3 times daily as needed for Dizziness 30 tablet 0     No current facility-administered medications for this visit. Allergies   Allergen Reactions    Other      Patient states \"Alergic to Calcium\"     Reclast [Zoledronic Acid]      Social: living with dtr at present. Review of Systems   Constitutional: Positive for fatigue (improved). HENT: Negative. Negative for nosebleeds. Eyes: Negative. Respiratory: Negative. Negative for cough. Cardiovascular: Negative. Gastrointestinal: Positive for constipation. Genitourinary: Negative. Musculoskeletal: Negative. Skin: Positive for pallor. Allergic/Immunologic: Negative. Neurological: Positive for dizziness and weakness (improved). Hematological: Bruises/bleeds easily. Psychiatric/Behavioral: Positive for confusion. Negative for agitation, behavioral problems and sleep disturbance. Objective:   Physical Exam   Constitutional: She appears well-developed and well-nourished. No distress. HENT:   Head: Normocephalic and atraumatic. Right Ear: External ear normal.   Left Ear: External ear normal.   Mouth/Throat: No oropharyngeal exudate. Eyes: Conjunctivae are normal.   Neck: Neck supple. No thyromegaly present. Cardiovascular: Normal rate, regular rhythm and normal heart sounds. Pulmonary/Chest: Effort normal and breath sounds normal. No respiratory distress. She has no wheezes. Abdominal: Bowel sounds are normal. She exhibits no distension. There is no tenderness. Musculoskeletal: Normal range of motion. She exhibits no edema. Lymphadenopathy:     She has no cervical adenopathy. Neurological: She is alert. Skin: Skin is warm and dry. She is not diaphoretic. No pallor.    Psychiatric: k/uL    Basophils # 0.02 0.0 - 0.2 k/uL   Microalbumin, Ur   Result Value Ref Range    Microalb, Ur 288 (H) <21 mg/L    Creatinine, Ur 160.4 28.0 - 217.0 mg/dL    Microalb/Crt. Ratio 180 (H) <25 mcg/mg creat   UA W/REFLEX CULTURE   Result Value Ref Range    Color, UA NOT REPORTED YEL    Turbidity UA NOT REPORTED CLEAR    Glucose, Ur NEGATIVE NEG    Bilirubin Urine NEGATIVE NEG    Ketones, Urine NEGATIVE NEG    Specific Gravity, UA 1.025 1.010 - 1.025    Urine Hgb NEGATIVE NEG    pH, UA 6.0 5.0 - 6.0    Protein, UA 1+ (A) NEG    Urobilinogen, Urine Normal NORM    Nitrite, Urine NEGATIVE NEG    Leukocyte Esterase, Urine 1+ (A) NEG    Urinalysis Comments NOT REPORTED    Microscopic Urinalysis   Result Value Ref Range    -          WBC, UA 25 TO 50 0 - 4 /HPF    RBC, UA 0 TO 4 0 - 4 /HPF    Casts UA 25 TO 50 0 - 2 /LPF    Crystals UA NOT REPORTED NONE /HPF    Epithelial Cells UA 5 TO 10 0 - 5 /HPF    Renal Epithelial, Urine NOT REPORTED 0 /HPF    Bacteria, UA 2+ (A) NONE    Mucus, UA NOT REPORTED NONE    Trichomonas, UA NOT REPORTED NONE    Amorphous, UA NOT REPORTED NONE    Other Observations UA NOT REPORTED NREQ    Yeast, UA NOT REPORTED NONE       Assessment:      Encounter Diagnoses   Name Primary?  Chronic fatigue;stable. Yes    Type 2 diabetes mellitus without complication, without long-term current use of insulin (HCC):stable     Pure hypercholesterolemia;     Essential hypertension:stable     Dementia without behavioral disturbance, unspecified dementia type;slowly progressive. Paroxysmal atrial fibrillation        Plan:      fatigue and weakness referred to legs;had  improved with hold of statin implying some evidence for drug related issues. Plan to cont. Off statin. Finished PT with perceived benefit. Cont. Wt. Bearing as safe and able. She often feels the legs \"melt\" getting out of the car. Recurrent issues , usually getting out of car. Often tied to uti, which is becoming recurrent. Diabetes: good control at present. Will cont. Dietary control. a1c at 4.9%    Hyperlipidemia: opting to stop statin as above due to side effect concerns. Discussed issues at length. Given her age we are being more conservative and concentrating more on quality of life. Will cont. Off statin going forward. Htn: good control at present. bp elevated in ED during vertigo episode, otherwise fairly normal range at home. Some orthostasis concerns and she was started on mestinon through her cardiologist.  Cont. Same. She has had no syncope or persistent dizziness . Watching for low bp symptoms . Bradycardia: ongoing. Some fatigue complaints but not consistently bradycardic in the past on lopressor. Opting to observe for now on same dose and she will call over the interval with any concerns for low pulse, dizziness, or progressing fatigue. . On lopressor and amiodarone. She previously had a pacer, which was removed as she was not having a lot of atrial fibrillation issues over the past 2 years. None  In place at present. Some advancing dementia suspected. More short term memory issues at Present. Moved to [facility] to live with [facility]. At present. Doing well . No behavioral issues. She seems to be doing well. Some recent exacerbations. Trouble getting dressed, more confused at times. Needing direction. No problematic behaviors. Planning to trial aricept next interval after discussion with her and dt. pSantos Leos. No recurrent episodes over the interval, down from 2-3 episodes a week,  since starting amiodarone. She is unaware of palpitations, but generally feels tired and fatigued when this happens. Will cont eliquis and Lopressor. She is using 12.5mg bid. Option of additional 12.5mg dose for recurrent a. Fib.     CAD: CABG x4 in 3/06. asx at present. Preserved EF. Cont. Current meds and routine cardiology follow up. plavix stopped due to increased bleeding .      CKD: mild.   Serial observation at

## 2017-12-22 RX ORDER — PANTOPRAZOLE SODIUM 40 MG/1
TABLET, DELAYED RELEASE ORAL
Qty: 30 TABLET | Refills: 0 | Status: SHIPPED | OUTPATIENT
Start: 2017-12-22 | End: 2018-01-01 | Stop reason: SDUPTHER

## 2017-12-28 ENCOUNTER — HOSPITAL ENCOUNTER (INPATIENT)
Age: 82
LOS: 5 days | Discharge: NURSING, REHAB OR LONG-TERM CARE | DRG: 312 | End: 2018-01-02
Attending: EMERGENCY MEDICINE | Admitting: FAMILY MEDICINE
Payer: MEDICARE

## 2017-12-28 ENCOUNTER — OFFICE VISIT (OUTPATIENT)
Dept: PRIMARY CARE CLINIC | Age: 82
End: 2017-12-28

## 2017-12-28 ENCOUNTER — APPOINTMENT (OUTPATIENT)
Dept: GENERAL RADIOLOGY | Age: 82
DRG: 312 | End: 2017-12-28
Payer: MEDICARE

## 2017-12-28 ENCOUNTER — HOSPITAL ENCOUNTER (OUTPATIENT)
Age: 82
Setting detail: SPECIMEN
Discharge: HOME | DRG: 312 | End: 2017-12-28
Payer: MEDICARE

## 2017-12-28 ENCOUNTER — APPOINTMENT (OUTPATIENT)
Dept: CT IMAGING | Age: 82
DRG: 312 | End: 2017-12-28
Payer: MEDICARE

## 2017-12-28 VITALS — WEIGHT: 112.4 LBS | BODY MASS INDEX: 20.55 KG/M2

## 2017-12-28 DIAGNOSIS — R53.83 OTHER FATIGUE: ICD-10-CM

## 2017-12-28 DIAGNOSIS — R30.0 DYSURIA: Primary | ICD-10-CM

## 2017-12-28 DIAGNOSIS — R55 NEAR SYNCOPE: Primary | ICD-10-CM

## 2017-12-28 DIAGNOSIS — R30.0 DYSURIA: ICD-10-CM

## 2017-12-28 DIAGNOSIS — J32.0 LEFT MAXILLARY SINUSITIS: ICD-10-CM

## 2017-12-28 LAB
-: ABNORMAL
ABSOLUTE EOS #: 0.2 K/UL (ref 0–0.4)
ABSOLUTE IMMATURE GRANULOCYTE: ABNORMAL K/UL (ref 0–0.3)
ABSOLUTE LYMPH #: 1.8 K/UL (ref 1–4.8)
ABSOLUTE MONO #: 0.6 K/UL (ref 0.1–1.2)
ALBUMIN SERPL-MCNC: 3.7 G/DL (ref 3.5–5.2)
ALBUMIN/GLOBULIN RATIO: 1.5 (ref 1–2.5)
ALP BLD-CCNC: 57 U/L (ref 35–104)
ALT SERPL-CCNC: 12 U/L (ref 5–33)
AMORPHOUS: ABNORMAL
ANION GAP SERPL CALCULATED.3IONS-SCNC: 13 MMOL/L (ref 9–17)
AST SERPL-CCNC: 19 U/L
BACTERIA: ABNORMAL
BASOPHILS # BLD: 1 % (ref 0–1)
BASOPHILS ABSOLUTE: 0.1 K/UL (ref 0–0.2)
BILIRUB SERPL-MCNC: 0.17 MG/DL (ref 0.3–1.2)
BILIRUBIN URINE: NEGATIVE
BUN BLDV-MCNC: 17 MG/DL (ref 8–23)
BUN/CREAT BLD: 15 (ref 9–20)
CALCIUM SERPL-MCNC: 9.8 MG/DL (ref 8.6–10.4)
CASTS UA: ABNORMAL /LPF (ref 0–2)
CHLORIDE BLD-SCNC: 107 MMOL/L (ref 98–107)
CO2: 23 MMOL/L (ref 20–31)
COLOR: ABNORMAL
COMMENT UA: ABNORMAL
CREAT SERPL-MCNC: 1.1 MG/DL (ref 0.5–0.9)
CRYSTALS, UA: ABNORMAL /HPF
DIFFERENTIAL TYPE: ABNORMAL
EKG ATRIAL RATE: 55 BPM
EKG P AXIS: 75 DEGREES
EKG P-R INTERVAL: 194 MS
EKG Q-T INTERVAL: 522 MS
EKG QRS DURATION: 84 MS
EKG QTC CALCULATION (BAZETT): 499 MS
EKG R AXIS: 30 DEGREES
EKG T AXIS: 55 DEGREES
EKG VENTRICULAR RATE: 55 BPM
EOSINOPHILS RELATIVE PERCENT: 4 % (ref 1–7)
EPITHELIAL CELLS UA: ABNORMAL /HPF (ref 0–5)
GFR AFRICAN AMERICAN: 57 ML/MIN
GFR NON-AFRICAN AMERICAN: 47 ML/MIN
GFR SERPL CREATININE-BSD FRML MDRD: ABNORMAL ML/MIN/{1.73_M2}
GFR SERPL CREATININE-BSD FRML MDRD: ABNORMAL ML/MIN/{1.73_M2}
GLUCOSE BLD-MCNC: 85 MG/DL (ref 70–99)
GLUCOSE URINE: NEGATIVE
HCT VFR BLD CALC: 33.1 % (ref 36–46)
HEMOGLOBIN: 10.9 G/DL (ref 12–16)
IMMATURE GRANULOCYTES: ABNORMAL %
INR BLD: 1
KETONES, URINE: NEGATIVE
LEUKOCYTE ESTERASE, URINE: ABNORMAL
LYMPHOCYTES # BLD: 35 % (ref 16–46)
MCH RBC QN AUTO: 30.9 PG (ref 26–34)
MCHC RBC AUTO-ENTMCNC: 32.9 G/DL (ref 31–37)
MCV RBC AUTO: 93.9 FL (ref 80–100)
MONOCYTES # BLD: 12 % (ref 4–11)
MUCUS: ABNORMAL
MYOGLOBIN: 38 NG/ML (ref 25–58)
NITRITE, URINE: NEGATIVE
OTHER OBSERVATIONS UA: ABNORMAL
PDW BLD-RTO: 14.5 % (ref 11–14.5)
PH UA: 5.5 (ref 5–6)
PLATELET # BLD: 163 K/UL (ref 140–450)
PLATELET ESTIMATE: ABNORMAL
PMV BLD AUTO: 8.7 FL (ref 6–12)
POTASSIUM SERPL-SCNC: 5.1 MMOL/L (ref 3.7–5.3)
PROTEIN UA: NEGATIVE
PROTHROMBIN TIME: 10.7 SEC (ref 9.4–11.3)
RBC # BLD: 3.53 M/UL (ref 4–5.2)
RBC # BLD: ABNORMAL 10*6/UL
RBC UA: ABNORMAL /HPF (ref 0–4)
RENAL EPITHELIAL, UA: ABNORMAL /HPF
SEG NEUTROPHILS: 48 % (ref 43–77)
SEGMENTED NEUTROPHILS ABSOLUTE COUNT: 2.5 K/UL (ref 1.8–7.7)
SODIUM BLD-SCNC: 143 MMOL/L (ref 135–144)
SPECIFIC GRAVITY UA: 1.01 (ref 1.01–1.02)
TOTAL PROTEIN: 6.1 G/DL (ref 6.4–8.3)
TRICHOMONAS: ABNORMAL
TROPONIN INTERP: NORMAL
TROPONIN INTERP: NORMAL
TROPONIN T: <0.03 NG/ML
TROPONIN T: <0.03 NG/ML
TSH SERPL DL<=0.05 MIU/L-ACNC: 0.82 MIU/L (ref 0.3–5)
TURBIDITY: ABNORMAL
URINE HGB: NEGATIVE
UROBILINOGEN, URINE: NORMAL
WBC # BLD: 5.1 K/UL (ref 3.5–11)
WBC # BLD: ABNORMAL 10*3/UL
WBC UA: ABNORMAL /HPF (ref 0–4)
YEAST: ABNORMAL

## 2017-12-28 PROCEDURE — 71010 XR CHEST PORTABLE: CPT

## 2017-12-28 PROCEDURE — 36415 COLL VENOUS BLD VENIPUNCTURE: CPT

## 2017-12-28 PROCEDURE — 80053 COMPREHEN METABOLIC PANEL: CPT

## 2017-12-28 PROCEDURE — 93005 ELECTROCARDIOGRAM TRACING: CPT

## 2017-12-28 PROCEDURE — 6360000002 HC RX W HCPCS: Performed by: NURSE PRACTITIONER

## 2017-12-28 PROCEDURE — 85025 COMPLETE CBC W/AUTO DIFF WBC: CPT

## 2017-12-28 PROCEDURE — 2580000003 HC RX 258: Performed by: NURSE PRACTITIONER

## 2017-12-28 PROCEDURE — 2060000000 HC ICU INTERMEDIATE R&B

## 2017-12-28 PROCEDURE — 84443 ASSAY THYROID STIM HORMONE: CPT

## 2017-12-28 PROCEDURE — 6370000000 HC RX 637 (ALT 250 FOR IP): Performed by: NURSE PRACTITIONER

## 2017-12-28 PROCEDURE — 81001 URINALYSIS AUTO W/SCOPE: CPT

## 2017-12-28 PROCEDURE — 70450 CT HEAD/BRAIN W/O DYE: CPT

## 2017-12-28 PROCEDURE — 84484 ASSAY OF TROPONIN QUANT: CPT

## 2017-12-28 PROCEDURE — 83874 ASSAY OF MYOGLOBIN: CPT

## 2017-12-28 PROCEDURE — 85610 PROTHROMBIN TIME: CPT

## 2017-12-28 PROCEDURE — 99285 EMERGENCY DEPT VISIT HI MDM: CPT

## 2017-12-28 PROCEDURE — 94760 N-INVAS EAR/PLS OXIMETRY 1: CPT

## 2017-12-28 RX ORDER — HYDRALAZINE HYDROCHLORIDE 20 MG/ML
5 INJECTION INTRAMUSCULAR; INTRAVENOUS EVERY 6 HOURS PRN
Status: DISCONTINUED | OUTPATIENT
Start: 2017-12-28 | End: 2018-01-02 | Stop reason: HOSPADM

## 2017-12-28 RX ORDER — FERROUS SULFATE 325(65) MG
325 TABLET ORAL 2 TIMES DAILY WITH MEALS
Status: DISCONTINUED | OUTPATIENT
Start: 2017-12-29 | End: 2018-01-02 | Stop reason: HOSPADM

## 2017-12-28 RX ORDER — ONDANSETRON 2 MG/ML
4 INJECTION INTRAMUSCULAR; INTRAVENOUS EVERY 6 HOURS PRN
Status: DISCONTINUED | OUTPATIENT
Start: 2017-12-28 | End: 2018-01-02 | Stop reason: HOSPADM

## 2017-12-28 RX ORDER — DONEPEZIL HYDROCHLORIDE 5 MG/1
10 TABLET, FILM COATED ORAL NIGHTLY
Status: DISCONTINUED | OUTPATIENT
Start: 2017-12-28 | End: 2018-01-02 | Stop reason: HOSPADM

## 2017-12-28 RX ORDER — SODIUM CHLORIDE 0.9 % (FLUSH) 0.9 %
10 SYRINGE (ML) INJECTION EVERY 12 HOURS SCHEDULED
Status: DISCONTINUED | OUTPATIENT
Start: 2017-12-28 | End: 2018-01-02 | Stop reason: HOSPADM

## 2017-12-28 RX ORDER — LOSARTAN POTASSIUM 50 MG/1
50 TABLET ORAL DAILY
Status: DISCONTINUED | OUTPATIENT
Start: 2017-12-29 | End: 2017-12-31

## 2017-12-28 RX ORDER — AMIODARONE HYDROCHLORIDE 200 MG/1
200 TABLET ORAL DAILY
Status: DISCONTINUED | OUTPATIENT
Start: 2017-12-29 | End: 2018-01-02 | Stop reason: HOSPADM

## 2017-12-28 RX ORDER — DOCUSATE SODIUM 100 MG/1
100 CAPSULE, LIQUID FILLED ORAL 2 TIMES DAILY
Status: DISCONTINUED | OUTPATIENT
Start: 2017-12-28 | End: 2018-01-02 | Stop reason: HOSPADM

## 2017-12-28 RX ORDER — PYRIDOSTIGMINE BROMIDE 60 MG/1
30 TABLET ORAL 2 TIMES DAILY
Status: DISCONTINUED | OUTPATIENT
Start: 2017-12-28 | End: 2017-12-28

## 2017-12-28 RX ORDER — SODIUM CHLORIDE 0.9 % (FLUSH) 0.9 %
10 SYRINGE (ML) INJECTION PRN
Status: DISCONTINUED | OUTPATIENT
Start: 2017-12-28 | End: 2018-01-02 | Stop reason: HOSPADM

## 2017-12-28 RX ORDER — LANOLIN ALCOHOL/MO/W.PET/CERES
1000 CREAM (GRAM) TOPICAL DAILY
Status: DISCONTINUED | OUTPATIENT
Start: 2017-12-29 | End: 2018-01-02 | Stop reason: HOSPADM

## 2017-12-28 RX ORDER — SULFAMETHOXAZOLE AND TRIMETHOPRIM 800; 160 MG/1; MG/1
1 TABLET ORAL EVERY 12 HOURS SCHEDULED
Status: DISCONTINUED | OUTPATIENT
Start: 2017-12-28 | End: 2017-12-29

## 2017-12-28 RX ORDER — ACETAMINOPHEN 325 MG/1
650 TABLET ORAL EVERY 4 HOURS PRN
Status: DISCONTINUED | OUTPATIENT
Start: 2017-12-28 | End: 2018-01-02 | Stop reason: HOSPADM

## 2017-12-28 RX ORDER — MECLIZINE HCL 12.5 MG/1
25 TABLET ORAL EVERY 6 HOURS SCHEDULED
Status: DISCONTINUED | OUTPATIENT
Start: 2017-12-29 | End: 2018-01-02 | Stop reason: HOSPADM

## 2017-12-28 RX ORDER — PANTOPRAZOLE SODIUM 40 MG/1
40 TABLET, DELAYED RELEASE ORAL NIGHTLY
Status: DISCONTINUED | OUTPATIENT
Start: 2017-12-28 | End: 2018-01-02 | Stop reason: HOSPADM

## 2017-12-28 RX ADMIN — Medication 10 ML: at 22:38

## 2017-12-28 RX ADMIN — PANTOPRAZOLE SODIUM 40 MG: 40 TABLET, DELAYED RELEASE ORAL at 22:38

## 2017-12-28 RX ADMIN — DOCUSATE SODIUM 100 MG: 100 CAPSULE, LIQUID FILLED ORAL at 22:38

## 2017-12-28 RX ADMIN — HYDRALAZINE HYDROCHLORIDE 5 MG: 20 INJECTION INTRAMUSCULAR; INTRAVENOUS at 22:38

## 2017-12-28 RX ADMIN — MECLIZINE 25 MG: 12.5 TABLET ORAL at 23:48

## 2017-12-28 RX ADMIN — SULFAMETHOXAZOLE AND TRIMETHOPRIM 1 TABLET: 800; 160 TABLET ORAL at 22:38

## 2017-12-28 RX ADMIN — DONEPEZIL HYDROCHLORIDE 10 MG: 5 TABLET, FILM COATED ORAL at 22:37

## 2017-12-28 ASSESSMENT — ENCOUNTER SYMPTOMS
SHORTNESS OF BREATH: 0
EYE PAIN: 0
ABDOMINAL PAIN: 0
VOMITING: 0
BLOOD IN STOOL: 0
DIARRHEA: 0
CONSTIPATION: 0
COUGH: 0
BACK PAIN: 0
NAUSEA: 0

## 2017-12-28 ASSESSMENT — PAIN SCALES - GENERAL
PAINLEVEL_OUTOF10: 0
PAINLEVEL_OUTOF10: 0

## 2017-12-28 NOTE — ED PROVIDER NOTES
by mouth 2 times daily    FIBER PO    Take by mouth    GLUCOSE BLOOD (BLOOD GLUCOSE TEST STRIPS) STRP    Test blood glucose levels once daily and as needed for hyper/hypoglycemic symptoms. Dx. Diabetes (250.00)    LACTULOSE (CHRONULAC) 10 GM/15ML SOLUTION    15-30 ml po QHS    LOSARTAN (COZAAR) 50 MG TABLET    Take 50 mg by mouth daily    MECLIZINE (ANTIVERT) 25 MG TABLET    Take 1 tablet by mouth 3 times daily as needed for Dizziness    METOPROLOL TARTRATE (LOPRESSOR) 25 MG TABLET    1/2 tab po BID    MULTIPLE VITAMINS-MINERALS (MULTIVITAMIN PO)    Take  by mouth daily. OMEGA-3 FATTY ACIDS (FISH OIL PO)    Take  by mouth daily. PANTOPRAZOLE (PROTONIX) 40 MG TABLET    TAKE 1 TABLET BY MOUTH EVERY DAY    PYRIDOSTIGMINE (MESTINON) 60 MG TABLET    Take 30 mg by mouth 2 times daily     SULFAMETHOXAZOLE-TRIMETHOPRIM (BACTRIM DS) 800-160 MG PER TABLET    Take 1 tablet by mouth 2 times daily for 10 days Take with food. TRUEPLUS LANCETS 33G MISC           ALLERGIES     is allergic to other and reclast [zoledronic acid]. FAMILY HISTORY     indicated that her mother is . She indicated that her father is . She indicated that her sister is alive. She indicated that only one of her two brothers is alive. She indicated that her maternal grandmother is . She indicated that her maternal grandfather is . She indicated that her paternal grandmother is . She indicated that her paternal grandfather is . She indicated that her daughter is alive. She indicated that her son is . family history includes Alzheimer's Disease in her father; Arthritis in her daughter; Coronary Art Dis in her brother; Depression in her sister; Early Death in her brother; Heart Attack in her mother; Heart Disease in her mother; Osteoporosis in her sister; Other in her son; Thyroid Disease in her sister. SOCIAL HISTORY      reports that she has never smoked.  She has never used smokeless syncope    2. Other fatigue    3. Left maxillary sinusitis          DISPOSITION/PLAN   DISPOSITION     Condition on Disposition    Stable    PATIENT REFERRED TO:  No follow-up provider specified. DISCHARGE MEDICATIONS:  New Prescriptions    No medications on file       (Please note that portions of this note were completed with a voice recognition program.  Efforts were made to edit the dictations but occasionally words are mis-transcribed.)    Snell MD, F.A.A.E.M.   Attending Emergency Physician                           Demetria Ruiz MD  12/28/17 2005

## 2017-12-28 NOTE — ED NOTES
Writer called [facility] regarding patient admission to facility. Writer is advised the intake person is not present but is given a cell phone number for her, Mercedes Click @ [phone]. Writer called this number, no answer, message left.      Paul Kc RN  12/28/17 9728

## 2017-12-28 NOTE — ED NOTES
Writer spoke with Yuval Huggins from [facility]. She states she has been in touch with patient's family prior to their ED visit. She states she has told them they have three options for ECF admission payment, self pay, skilled/medicare, or medicaid. She states they family advised her they cannot afford to do self pay. She states patient does not qualify for skilled/Medicare admission. She states family told her they think patient has Medicaid but they are not certain and cannot produce a medicaid number. She states she had told them to contact [employer] to check on Medicaid status. She states she thinks the best thing at this point would be for patient to go home for another night and handle the payer issue in the morning. She states they cannot accept patient for ECF admission without a payer source.      Salvatore Holm RN  12/28/17 1102

## 2017-12-29 LAB
ABSOLUTE EOS #: 0.2 K/UL (ref 0–0.4)
ABSOLUTE IMMATURE GRANULOCYTE: ABNORMAL K/UL (ref 0–0.3)
ABSOLUTE LYMPH #: 2 K/UL (ref 1–4.8)
ABSOLUTE MONO #: 0.6 K/UL (ref 0.1–1.2)
ANION GAP SERPL CALCULATED.3IONS-SCNC: 11 MMOL/L (ref 9–17)
BASOPHILS # BLD: 1 % (ref 0–1)
BASOPHILS ABSOLUTE: 0 K/UL (ref 0–0.2)
BUN BLDV-MCNC: 15 MG/DL (ref 8–23)
BUN/CREAT BLD: 14 (ref 9–20)
CALCIUM SERPL-MCNC: 9.5 MG/DL (ref 8.6–10.4)
CHLORIDE BLD-SCNC: 109 MMOL/L (ref 98–107)
CO2: 23 MMOL/L (ref 20–31)
CREAT SERPL-MCNC: 1.07 MG/DL (ref 0.5–0.9)
DIFFERENTIAL TYPE: ABNORMAL
EKG ATRIAL RATE: 57 BPM
EKG P AXIS: 70 DEGREES
EKG P-R INTERVAL: 196 MS
EKG Q-T INTERVAL: 502 MS
EKG QRS DURATION: 80 MS
EKG QTC CALCULATION (BAZETT): 488 MS
EKG R AXIS: 47 DEGREES
EKG T AXIS: 74 DEGREES
EKG VENTRICULAR RATE: 57 BPM
EOSINOPHILS RELATIVE PERCENT: 4 % (ref 1–7)
GFR AFRICAN AMERICAN: 58 ML/MIN
GFR NON-AFRICAN AMERICAN: 48 ML/MIN
GFR SERPL CREATININE-BSD FRML MDRD: ABNORMAL ML/MIN/{1.73_M2}
GFR SERPL CREATININE-BSD FRML MDRD: ABNORMAL ML/MIN/{1.73_M2}
GLUCOSE BLD-MCNC: 108 MG/DL (ref 65–105)
GLUCOSE BLD-MCNC: 75 MG/DL (ref 70–99)
HCT VFR BLD CALC: 30.1 % (ref 36–46)
HEMOGLOBIN: 9.9 G/DL (ref 12–16)
IMMATURE GRANULOCYTES: ABNORMAL %
LYMPHOCYTES # BLD: 34 % (ref 16–46)
MCH RBC QN AUTO: 30.9 PG (ref 26–34)
MCHC RBC AUTO-ENTMCNC: 32.9 G/DL (ref 31–37)
MCV RBC AUTO: 93.8 FL (ref 80–100)
MONOCYTES # BLD: 11 % (ref 4–11)
PDW BLD-RTO: 14.5 % (ref 11–14.5)
PLATELET # BLD: 161 K/UL (ref 140–450)
PLATELET ESTIMATE: ABNORMAL
PMV BLD AUTO: 9.2 FL (ref 6–12)
POTASSIUM SERPL-SCNC: 4.7 MMOL/L (ref 3.7–5.3)
RBC # BLD: 3.21 M/UL (ref 4–5.2)
RBC # BLD: ABNORMAL 10*6/UL
SEG NEUTROPHILS: 50 % (ref 43–77)
SEGMENTED NEUTROPHILS ABSOLUTE COUNT: 2.9 K/UL (ref 1.8–7.7)
SODIUM BLD-SCNC: 143 MMOL/L (ref 135–144)
TROPONIN INTERP: NORMAL
TROPONIN T: <0.03 NG/ML
WBC # BLD: 5.8 K/UL (ref 3.5–11)
WBC # BLD: ABNORMAL 10*3/UL

## 2017-12-29 PROCEDURE — 85025 COMPLETE CBC W/AUTO DIFF WBC: CPT

## 2017-12-29 PROCEDURE — 6370000000 HC RX 637 (ALT 250 FOR IP): Performed by: NURSE PRACTITIONER

## 2017-12-29 PROCEDURE — 2580000003 HC RX 258: Performed by: NURSE PRACTITIONER

## 2017-12-29 PROCEDURE — 6370000000 HC RX 637 (ALT 250 FOR IP): Performed by: FAMILY MEDICINE

## 2017-12-29 PROCEDURE — 93005 ELECTROCARDIOGRAM TRACING: CPT

## 2017-12-29 PROCEDURE — 80048 BASIC METABOLIC PNL TOTAL CA: CPT

## 2017-12-29 PROCEDURE — 2060000000 HC ICU INTERMEDIATE R&B

## 2017-12-29 PROCEDURE — 94760 N-INVAS EAR/PLS OXIMETRY 1: CPT

## 2017-12-29 PROCEDURE — 36415 COLL VENOUS BLD VENIPUNCTURE: CPT

## 2017-12-29 PROCEDURE — 6360000002 HC RX W HCPCS: Performed by: FAMILY MEDICINE

## 2017-12-29 PROCEDURE — 97112 NEUROMUSCULAR REEDUCATION: CPT

## 2017-12-29 PROCEDURE — G8981 BODY POS CURRENT STATUS: HCPCS

## 2017-12-29 PROCEDURE — 97162 PT EVAL MOD COMPLEX 30 MIN: CPT

## 2017-12-29 PROCEDURE — 84484 ASSAY OF TROPONIN QUANT: CPT

## 2017-12-29 PROCEDURE — 82947 ASSAY GLUCOSE BLOOD QUANT: CPT

## 2017-12-29 PROCEDURE — 99232 SBSQ HOSP IP/OBS MODERATE 35: CPT | Performed by: FAMILY MEDICINE

## 2017-12-29 PROCEDURE — G8982 BODY POS GOAL STATUS: HCPCS

## 2017-12-29 RX ORDER — FLUCONAZOLE 100 MG/1
200 TABLET ORAL DAILY
Status: DISCONTINUED | OUTPATIENT
Start: 2017-12-29 | End: 2017-12-29

## 2017-12-29 RX ORDER — CIPROFLOXACIN 2 MG/ML
200 INJECTION, SOLUTION INTRAVENOUS EVERY 12 HOURS
Status: DISCONTINUED | OUTPATIENT
Start: 2017-12-29 | End: 2018-01-02 | Stop reason: HOSPADM

## 2017-12-29 RX ORDER — LACTOBACILLUS RHAMNOSUS GG 10B CELL
1 CAPSULE ORAL 3 TIMES DAILY
Status: DISCONTINUED | OUTPATIENT
Start: 2017-12-29 | End: 2018-01-02 | Stop reason: HOSPADM

## 2017-12-29 RX ADMIN — DONEPEZIL HYDROCHLORIDE 10 MG: 5 TABLET, FILM COATED ORAL at 20:02

## 2017-12-29 RX ADMIN — NYSTATIN 500000 UNITS: 100000 SUSPENSION ORAL at 00:12

## 2017-12-29 RX ADMIN — NYSTATIN 500000 UNITS: 100000 SUSPENSION ORAL at 12:12

## 2017-12-29 RX ADMIN — Medication 10 ML: at 07:59

## 2017-12-29 RX ADMIN — Medication 10 ML: at 20:02

## 2017-12-29 RX ADMIN — PSYLLIUM HUSK 0.52 G: 20 CAPSULE ORAL at 07:58

## 2017-12-29 RX ADMIN — PANTOPRAZOLE SODIUM 40 MG: 40 TABLET, DELAYED RELEASE ORAL at 20:02

## 2017-12-29 RX ADMIN — AMIODARONE HYDROCHLORIDE 200 MG: 200 TABLET ORAL at 07:59

## 2017-12-29 RX ADMIN — Medication 1 CAPSULE: at 20:02

## 2017-12-29 RX ADMIN — FERROUS SULFATE TAB 325 MG (65 MG ELEMENTAL FE) 325 MG: 325 (65 FE) TAB at 16:19

## 2017-12-29 RX ADMIN — DOCUSATE SODIUM 100 MG: 100 CAPSULE, LIQUID FILLED ORAL at 20:02

## 2017-12-29 RX ADMIN — LOSARTAN POTASSIUM 50 MG: 50 TABLET ORAL at 07:58

## 2017-12-29 RX ADMIN — CYANOCOBALAMIN TAB 1000 MCG 1000 MCG: 1000 TAB at 07:59

## 2017-12-29 RX ADMIN — MECLIZINE 25 MG: 12.5 TABLET ORAL at 05:36

## 2017-12-29 RX ADMIN — MECLIZINE 25 MG: 12.5 TABLET ORAL at 23:55

## 2017-12-29 RX ADMIN — CIPROFLOXACIN 200 MG: 2 INJECTION INTRAVENOUS at 14:52

## 2017-12-29 RX ADMIN — MECLIZINE 25 MG: 12.5 TABLET ORAL at 17:51

## 2017-12-29 RX ADMIN — NYSTATIN 500000 UNITS: 100000 SUSPENSION ORAL at 07:59

## 2017-12-29 RX ADMIN — Medication 1 CAPSULE: at 16:19

## 2017-12-29 RX ADMIN — NYSTATIN 500000 UNITS: 100000 SUSPENSION ORAL at 16:19

## 2017-12-29 RX ADMIN — NYSTATIN 500000 UNITS: 100000 SUSPENSION ORAL at 20:02

## 2017-12-29 RX ADMIN — FERROUS SULFATE TAB 325 MG (65 MG ELEMENTAL FE) 325 MG: 325 (65 FE) TAB at 07:58

## 2017-12-29 RX ADMIN — DOCUSATE SODIUM 100 MG: 100 CAPSULE, LIQUID FILLED ORAL at 07:58

## 2017-12-29 RX ADMIN — MECLIZINE 25 MG: 12.5 TABLET ORAL at 12:12

## 2017-12-29 RX ADMIN — SULFAMETHOXAZOLE AND TRIMETHOPRIM 1 TABLET: 800; 160 TABLET ORAL at 07:59

## 2017-12-29 ASSESSMENT — PAIN SCALES - GENERAL
PAINLEVEL_OUTOF10: 0

## 2017-12-29 NOTE — ED NOTES
Writer spoke with patient and her family regarding writer's conversation with Rosita Glover. They verbalize understanding stating they spoke with her as well and she told them she will call [employer] right away in the morning to get the Medicaid process started. Family does not feel safe taking patient home tonight. Writer discussed with them our limitations regarding hospital admission. Writer reviewed lab results with them. Writer also advised them Dr. Estela Hilliard will be coming in yet to speak with them regarding test results and plan for patient. They verbalize understanding. Patient is pulled up in bed and positioned for comfort. Side rails are up x2, needs denied.      Love Jacinto, RN  12/28/17 9343

## 2017-12-29 NOTE — H&P
HOSPITALIST ADMISSION H&P      REASON FOR ADMISSION:  Near syncope, hypertension, vertigo  ESTIMATED LENGTH OF STAY:>2mn, 3 days    ATTENDING/ADMITTING PHYSICIAN: Christian Ring MD/Brandy Rankin NP-C, FNP-BC  PCP: Avinash Hernandez MD    HISTORY OF PRESENT ILLNESS:      The patient is a 80 y.o. female patient of Avinash Hernandez MD who presents from the ER with c/o extreme generalized weakness, recurring and gradually worsening near-syncopal events, increasing confusion, recurring chronic vertigo, and uncontrolled hypertension. She is currently living with her daughter. She had a syncopal episode on 12/25/2018 while in the shower, but did not seek medical treatment at that time. She ambulated with a walker up until 2 weeks ago, but has been mostly wheelchair bound over the past 2 weeks due to her vertigo and weakness, per the daughter's report. Her daughter reports occasional visual hallucinations/delirium that began today. She has been treated with PO antibiotics for a recurring/resistant UTI over the past 4 weeks with PO Augmentin and then Bactrim. Repeat UA today shows improvement of the UTI and she has 6 doses of the Bactrim remaining in the prescribed 10 day course. Head CT in ER Impression:  No acute intracranial abnormality. High attenuation material left maxillary sinus suggesting sinusitis. The high attenuation suggests the possibility of a fungal process. Her daughter describes chronic rhinitis, and states that nasal drainage has been clear. She has had a previous CVA with residual right peripheral visual field cut. She has been taken off anticoagulation due to severe diverticular disease with chronic bleeding. In talking with the daughter, she states she has been trying to seek SNF placement (at the [facility]) for her mother since she is requiring assistance with bathing, dressing, grooming, feeding, toileting, and medication administration.  The daughter states she does not feel she can system/connective tissue disease, hematologic, neuropsych, skin, lymphatics, or malignancies. Code status discussed with patient/family--wishes for DNR-CCA at this time. PHYSICAL EXAM:  Vitals:  BP (!) 186/85   Pulse 58   Temp 98.2 °F (36.8 °C) (Tympanic)   Resp 16   Ht 5' 2\" (1.575 m)   Wt 110 lb (49.9 kg)   SpO2 94%   BMI 20.12 kg/m²     General: awake, alert and cooperative  HEENT: mild left maxillary tenderness elicited with palpation, TMs translucent without errythema bilaterally, pharynx without erythema or exudate, Mucosa Pink, Moist, Normocephalic and Atraumatic--coating on tongue noted  Neck: Supple, Carotid Pulses Present, No Masses, Tenderness, Nodularity and No Lymphadenopathy  Chest/Lungs: diminished bases bilaterally and Clear to Auscultation without Rales, Rhonchi, or Wheezes  Cardiac: regular rhythm and bradycardic rate in the 50's  GI/Abdomen:  Bowel Sounds Present, Soft, Non-tender, without Guarding or Rebound Tenderness and No Masses  : Not examined  EXT/Skin: No Edema, No Cyanosis and No Clubbing  Neuro: awake and alert, generalized weakness, [place], previous CVA with right visual field cut at baseline, gait abnormality at baseline (uses walker at veers to her right at baseline per daughter's report) and Dementia at baseline      LABS:    CBC with Differential:    Lab Results   Component Value Date    WBC 5.1 12/28/2017    RBC 3.53 12/28/2017    HGB 10.9 12/28/2017    HCT 33.1 12/28/2017     12/28/2017    MCV 93.9 12/28/2017    MCH 30.9 12/28/2017    MCHC 32.9 12/28/2017    RDW 14.5 12/28/2017    LYMPHOPCT 35 12/28/2017    MONOPCT 12 12/28/2017    BASOPCT 1 12/28/2017    MONOSABS 0.60 12/28/2017    LYMPHSABS 1.80 12/28/2017    EOSABS 0.20 12/28/2017    BASOSABS 0.10 12/28/2017    DIFFTYPE NOT REPORTED 12/28/2017     CMP:    Lab Results   Component Value Date     12/28/2017    K 5.1 12/28/2017     12/28/2017    CO2 23 12/28/2017    BUN 17 12/28/2017    CREATININE

## 2017-12-29 NOTE — PROGRESS NOTES
Reason for Exam: Altered mental status; weight loss; decreased appetite; hx of stroke; hx of quadruple bypass Acuity: Acute Type of Exam: Initial FINDINGS: Status post median sternotomy. The heart size is stable. Linear atelectatic changes in the right upper lobe. No other airspace disease. No pleural effusion. No pulmonary edema. Stable chest demonstrating linear atelectatic changes or scarring in the right upper lobe       Assessment :   1. uti  2. A fib/rate controled with amidarone  3. Sinus ?likely from fungal.still will hold diflucan due to QT prolongation  4. Change rx from bactrim to iv cipro and plan for d/c to rehab on Sunday  5. Age related physical debility in setting of weakness ad falls being rx with ECR placement and assistance with ADL     Plan:   1. Therapy to see  2. See order    Patient Active Problem List:     Hypertension     Coronary artery disease involving native coronary artery of native heart     Anemia     Chronic atrial fibrillation (HCC)     Hypotensive episode     Vertigo     Orthostasis     Vasomotor rhinitis     Diverticulosis     GERD (gastroesophageal reflux disease)     Heartburn     Diabetes mellitus, type 2 (HCC)     Stroke ([facility]     Renal disease     Atrophic vaginitis     Osteoporosis     Dementia     Near syncope      Anticipated Disposition upon discharge: [] Home                                                                         [] Home with Home Health                                                                         [] [facility]                                                                         [] [address]      Patient is admitted as inpatient status because of co-morbidities listed above, severity of signs and symptoms as outlined, requirement for current medical therapies and most importantly because of direct risk to patient if care not provided in a hospital setting.           Muriel Read,

## 2017-12-29 NOTE — PROGRESS NOTES
Nutrition Assessment    Type and Reason for Visit: Initial    Nutrition Recommendations: Recommend starting ONS for increased calorie and protein consumption. Will continue to monitor patient and suggest nutrient dense foods. Malnutrition Assessment:  · Malnutrition Status: Meets the criteria for moderate malnutrition  · Context:    · Findings of the 6 clinical characteristics of malnutrition (Minimum of 2 out of 6 clinical characteristics is required to make the diagnosis of moderate or severe Protein Calorie Malnutrition based on AND/ASPEN Guidelines):  1. Energy Intake-51% to 75%,      2. Weight Loss-Unable to assess,    3. Muscle Loss- , Temples (temporalis muscle), Clavicles (pectoralis and deltoids)    Nutrition Diagnosis:   · Problem: Inadequate oral intake  · Etiology: related to Renal dysfunction, Cognitive or neurological impairment     Signs and symptoms:  as evidenced by Intake 25-50%    Nutrition Assessment:  · Subjective Assessment: Pt. referred to nutrition due to unintended weight loss and poor intake/poor apetite. Pt has agreed to start a nutrition supplement 3x per day in addition to meals. · Current Nutrition Therapies:  · Oral Diet Orders: 2gm Sodium, Cardiac   · Oral Diet intake: 26-50%  · Oral Nutrition Supplement (ONS) Orders:  Start chocolate ensure 3x daily  · ONS intake: 26-50%  · Anthropometric Measures:  · Ht: 5' 2\" (157.5 cm)   · Current Body Wt: 110 lb (49.9 kg)  · Ideal Body Wt: 110 lb (49.9 kg), % Ideal Body 100%  · BMI Classification: BMI 18.5 - 24.9 Normal Weight  · Comparative Standards (Estimated Nutrition Needs):  · Estimated Daily Total Kcal: 2596-1235  · Estimated Daily Protein (g): 60-70    Estimated Intake vs Estimated Needs: Intake Less Than Needs    Nutrition Risk Level:  Moderate    Nutrition Interventions:   Continue current diet, Start ONS  Continued Inpatient Monitoring    Nutrition Evaluation:   · Evaluation: Goals set   · Goals: Consume oral supplement 2x

## 2017-12-30 LAB
ABSOLUTE EOS #: 0.2 K/UL (ref 0–0.4)
ABSOLUTE IMMATURE GRANULOCYTE: ABNORMAL K/UL (ref 0–0.3)
ABSOLUTE LYMPH #: 2.4 K/UL (ref 1–4.8)
ABSOLUTE MONO #: 0.7 K/UL (ref 0.1–1.2)
ANION GAP SERPL CALCULATED.3IONS-SCNC: 11 MMOL/L (ref 9–17)
BASOPHILS # BLD: 1 % (ref 0–1)
BASOPHILS ABSOLUTE: 0.1 K/UL (ref 0–0.2)
BUN BLDV-MCNC: 21 MG/DL (ref 8–23)
BUN/CREAT BLD: 14 (ref 9–20)
CALCIUM SERPL-MCNC: 9.4 MG/DL (ref 8.6–10.4)
CHLORIDE BLD-SCNC: 107 MMOL/L (ref 98–107)
CO2: 23 MMOL/L (ref 20–31)
CREAT SERPL-MCNC: 1.52 MG/DL (ref 0.5–0.9)
DIFFERENTIAL TYPE: ABNORMAL
EOSINOPHILS RELATIVE PERCENT: 4 % (ref 1–7)
GFR AFRICAN AMERICAN: 39 ML/MIN
GFR NON-AFRICAN AMERICAN: 32 ML/MIN
GFR SERPL CREATININE-BSD FRML MDRD: ABNORMAL ML/MIN/{1.73_M2}
GFR SERPL CREATININE-BSD FRML MDRD: ABNORMAL ML/MIN/{1.73_M2}
GLUCOSE BLD-MCNC: 75 MG/DL (ref 70–99)
HCT VFR BLD CALC: 30.2 % (ref 36–46)
HEMOGLOBIN: 9.7 G/DL (ref 12–16)
IMMATURE GRANULOCYTES: ABNORMAL %
LYMPHOCYTES # BLD: 39 % (ref 16–46)
MCH RBC QN AUTO: 30.4 PG (ref 26–34)
MCHC RBC AUTO-ENTMCNC: 32.2 G/DL (ref 31–37)
MCV RBC AUTO: 94.5 FL (ref 80–100)
MONOCYTES # BLD: 11 % (ref 4–11)
PDW BLD-RTO: 14.3 % (ref 11–14.5)
PLATELET # BLD: 161 K/UL (ref 140–450)
PLATELET ESTIMATE: ABNORMAL
PMV BLD AUTO: 9 FL (ref 6–12)
POTASSIUM SERPL-SCNC: 4.5 MMOL/L (ref 3.7–5.3)
RBC # BLD: 3.19 M/UL (ref 4–5.2)
RBC # BLD: ABNORMAL 10*6/UL
SEG NEUTROPHILS: 45 % (ref 43–77)
SEGMENTED NEUTROPHILS ABSOLUTE COUNT: 2.8 K/UL (ref 1.8–7.7)
SODIUM BLD-SCNC: 141 MMOL/L (ref 135–144)
WBC # BLD: 6.2 K/UL (ref 3.5–11)
WBC # BLD: ABNORMAL 10*3/UL

## 2017-12-30 PROCEDURE — 2580000003 HC RX 258: Performed by: NURSE PRACTITIONER

## 2017-12-30 PROCEDURE — 36415 COLL VENOUS BLD VENIPUNCTURE: CPT

## 2017-12-30 PROCEDURE — 99232 SBSQ HOSP IP/OBS MODERATE 35: CPT | Performed by: FAMILY MEDICINE

## 2017-12-30 PROCEDURE — 6360000002 HC RX W HCPCS: Performed by: FAMILY MEDICINE

## 2017-12-30 PROCEDURE — 6360000002 HC RX W HCPCS: Performed by: NURSE PRACTITIONER

## 2017-12-30 PROCEDURE — 93005 ELECTROCARDIOGRAM TRACING: CPT

## 2017-12-30 PROCEDURE — 6370000000 HC RX 637 (ALT 250 FOR IP): Performed by: NURSE PRACTITIONER

## 2017-12-30 PROCEDURE — 80048 BASIC METABOLIC PNL TOTAL CA: CPT

## 2017-12-30 PROCEDURE — 94760 N-INVAS EAR/PLS OXIMETRY 1: CPT

## 2017-12-30 PROCEDURE — 97116 GAIT TRAINING THERAPY: CPT | Performed by: PHYSICAL THERAPY ASSISTANT

## 2017-12-30 PROCEDURE — 85025 COMPLETE CBC W/AUTO DIFF WBC: CPT

## 2017-12-30 PROCEDURE — 6370000000 HC RX 637 (ALT 250 FOR IP): Performed by: FAMILY MEDICINE

## 2017-12-30 PROCEDURE — 2060000000 HC ICU INTERMEDIATE R&B

## 2017-12-30 RX ORDER — MECLIZINE HYDROCHLORIDE 25 MG/1
25 TABLET ORAL 3 TIMES DAILY
Qty: 90 TABLET | Refills: 0 | Status: CANCELLED | DISCHARGE
Start: 2017-12-30

## 2017-12-30 RX ADMIN — NYSTATIN 500000 UNITS: 100000 SUSPENSION ORAL at 07:54

## 2017-12-30 RX ADMIN — MECLIZINE 25 MG: 12.5 TABLET ORAL at 12:37

## 2017-12-30 RX ADMIN — DOCUSATE SODIUM 100 MG: 100 CAPSULE, LIQUID FILLED ORAL at 07:54

## 2017-12-30 RX ADMIN — FERROUS SULFATE TAB 325 MG (65 MG ELEMENTAL FE) 325 MG: 325 (65 FE) TAB at 07:54

## 2017-12-30 RX ADMIN — MECLIZINE 25 MG: 12.5 TABLET ORAL at 23:10

## 2017-12-30 RX ADMIN — Medication 1 CAPSULE: at 14:22

## 2017-12-30 RX ADMIN — LOSARTAN POTASSIUM 50 MG: 50 TABLET ORAL at 07:54

## 2017-12-30 RX ADMIN — MECLIZINE 25 MG: 12.5 TABLET ORAL at 06:40

## 2017-12-30 RX ADMIN — PSYLLIUM HUSK 0.52 G: 20 CAPSULE ORAL at 07:54

## 2017-12-30 RX ADMIN — ONDANSETRON 4 MG: 2 INJECTION INTRAMUSCULAR; INTRAVENOUS at 14:22

## 2017-12-30 RX ADMIN — CIPROFLOXACIN 200 MG: 2 INJECTION INTRAVENOUS at 14:22

## 2017-12-30 RX ADMIN — PANTOPRAZOLE SODIUM 40 MG: 40 TABLET, DELAYED RELEASE ORAL at 20:29

## 2017-12-30 RX ADMIN — Medication 10 ML: at 07:55

## 2017-12-30 RX ADMIN — CYANOCOBALAMIN TAB 1000 MCG 1000 MCG: 1000 TAB at 07:54

## 2017-12-30 RX ADMIN — DONEPEZIL HYDROCHLORIDE 10 MG: 5 TABLET, FILM COATED ORAL at 20:29

## 2017-12-30 RX ADMIN — AMIODARONE HYDROCHLORIDE 200 MG: 200 TABLET ORAL at 07:54

## 2017-12-30 RX ADMIN — FERROUS SULFATE TAB 325 MG (65 MG ELEMENTAL FE) 325 MG: 325 (65 FE) TAB at 18:35

## 2017-12-30 RX ADMIN — Medication 10 ML: at 20:29

## 2017-12-30 RX ADMIN — CIPROFLOXACIN 200 MG: 2 INJECTION INTRAVENOUS at 02:08

## 2017-12-30 RX ADMIN — Medication 10 ML: at 02:08

## 2017-12-30 RX ADMIN — DOCUSATE SODIUM 100 MG: 100 CAPSULE, LIQUID FILLED ORAL at 20:29

## 2017-12-30 RX ADMIN — NYSTATIN 500000 UNITS: 100000 SUSPENSION ORAL at 12:37

## 2017-12-30 RX ADMIN — MECLIZINE 25 MG: 12.5 TABLET ORAL at 18:35

## 2017-12-30 RX ADMIN — NYSTATIN 500000 UNITS: 100000 SUSPENSION ORAL at 18:35

## 2017-12-30 RX ADMIN — Medication 1 CAPSULE: at 07:54

## 2017-12-30 RX ADMIN — ACETAMINOPHEN 650 MG: 325 TABLET ORAL at 07:54

## 2017-12-30 RX ADMIN — Medication 1 CAPSULE: at 20:29

## 2017-12-30 RX ADMIN — NYSTATIN 500000 UNITS: 100000 SUSPENSION ORAL at 20:29

## 2017-12-30 ASSESSMENT — PAIN DESCRIPTION - LOCATION: LOCATION: HIP

## 2017-12-30 ASSESSMENT — PAIN DESCRIPTION - FREQUENCY: FREQUENCY: CONTINUOUS

## 2017-12-30 ASSESSMENT — PAIN SCALES - GENERAL
PAINLEVEL_OUTOF10: 0
PAINLEVEL_OUTOF10: 3
PAINLEVEL_OUTOF10: 0
PAINLEVEL_OUTOF10: 0

## 2017-12-30 ASSESSMENT — PAIN DESCRIPTION - PAIN TYPE: TYPE: CHRONIC PAIN

## 2017-12-30 ASSESSMENT — PAIN DESCRIPTION - ORIENTATION: ORIENTATION: LEFT

## 2017-12-30 ASSESSMENT — PAIN DESCRIPTION - ONSET: ONSET: ON-GOING

## 2017-12-30 ASSESSMENT — PAIN DESCRIPTION - PROGRESSION: CLINICAL_PROGRESSION: NOT CHANGED

## 2017-12-30 ASSESSMENT — PAIN DESCRIPTION - DESCRIPTORS: DESCRIPTORS: ACHING

## 2017-12-30 NOTE — PROGRESS NOTES
General  Response To Previous Treatment: Patient with no complaints from previous session. Family / Caregiver Present: Yes (Daughter)  General Comment  Comments: Pt was received in bed asleep. Easily awoken. Agreeable to PT. Denied pain. Pain Screening  Patient Currently in Pain: No  Vital Signs  Patient Currently in Pain: No       Orientation  Orientation  Overall Orientation Status: Within Normal Limits  Objective   Bed mobility  Rolling to Right: Stand by assistance  Supine to Sit: Contact guard assistance (Reported mild dizziness upon sitting. Decreased quickly with deep breathing.)  Transfers  Sit to Stand: Minimal Assistance (Denied dizziness upon standing.)  Stand to sit: Contact guard assistance  Ambulation 1  Surface: level tile  Device: Rolling Walker  Assistance: Contact guard assistance  Quality of Gait: Slow and steady. No LOB. Denied dizziness. Distance: 10' to bathroom. 40' x 2. Seated rest between. Ended in chair. Comments:  Pt fatigued post gait.        Balance  Sitting - Static: Good  Sitting - Dynamic: Good  Standing - Static: Good  Standing - Dynamic: Fair      Assessment      REQUIRES PT FOLLOW UP: Yes  Activity Tolerance  Activity Tolerance: Patient limited by fatigue;Patient limited by endurance       Discharge Recommendations:  Continue to assess pending progress    G-Code     OutComes Score           AM-PAC Score     Goals  Short term goals  Time Frame for Short term goals: 3days   Short term goal 1: Perform Left CRM   Short term goal 2: Bed Mobility SBA   Short term goal 3: Transfers with SBA  Short term goal 4: Amb x 50 ft with CGA and use of RW   Patient Goals   Patient goals : Vogel [place]  Times per day: Daily  Current Treatment Recommendations: Gait Training, Transfer Training, Balance Training  Safety Devices  Type of devices: Call light within reach, Chair alarm in place, Gait belt, Left in chair (Daughter present.)     Therapy Time   Individual

## 2017-12-30 NOTE — PROGRESS NOTES
Hospitalist Progress Note    Patient:  Patti Brock     YOB: 1927    MRN: [id number]   Admit date: 12/28/2017     Acct: [de-identified]     PCP: Yasmany Burkett MD    CC--Interval History: Near syncope--no further events, hypertension--labile, vertigo--improved per report      All other ROS negative except noted in HPI    Diet:  DIET CARDIAC; Low Sodium (2 GM); No Caffeine    Medications:  Scheduled Meds:   ciprofloxacin  200 mg Intravenous Q12H    lactobacillus  1 capsule Oral TID    amiodarone  200 mg Oral Daily    vitamin B-12  1,000 mcg Oral Daily    docusate sodium  100 mg Oral BID    donepezil  10 mg Oral Nightly    ferrous sulfate  325 mg Oral BID WC    psyllium  1 capsule Oral Daily    losartan  50 mg Oral Daily    pantoprazole  40 mg Oral Nightly    sodium chloride flush  10 mL Intravenous 2 times per day    meclizine  25 mg Oral 4 times per day    nystatin  5 mL Oral 4x Daily     Continuous Infusions:   PRN Meds:sodium chloride flush, acetaminophen, ondansetron, hydrALAZINE    Objective:  Pertinent Labs:  Hgb 9.9  Creatinine 1.07  GFR 48      Physical Exam:  Vitals: BP (!) 188/84   Pulse 60   Temp 97.5 °F (36.4 °C) (Tympanic)   Resp 16   Ht 5' 2\" (1.575 m)   Wt 110 lb (49.9 kg)   SpO2 97%   BMI 20.12 kg/m²   24 hour intake/output:  Intake/Output Summary (Last 24 hours) at 12/29/17 2052  Last data filed at 12/29/17 1721   Gross per 24 hour   Intake              500 ml   Output                0 ml   Net              500 ml       General: awake, alert and cooperative  HEENT: Mucosa Pink, Moist, Normocephalic and Atraumatic--thrush noted on tongue  Neck: Supple, Carotid Pulses Present, No Masses, Tenderness, Nodularity and No Lymphadenopathy  Chest/Lungs: diminished bases bilaterally and Clear to Auscultation without Rales, Rhonchi, or Wheezes  Cardiac: regular rhythm and bradycardic rate in the 50's  GI/Abdomen:  Bowel Sounds Present, Soft, Non-tender, without Guarding or Rebound Tenderness and No Masses  : Not examined  EXT/Skin: No Edema, No Cyanosis and No Clubbing  Neuro: awake and alert, generalized weakness, [place], previous CVA with right visual field cut at baseline, gait abnormality at baseline (uses walker at veers to her right at baseline per daughter's report) and Dementia at baseline        Assessment:    Active Problems:    Hypertension    Vertigo    Near syncope        Plan:  1. UTI--IV cipro  2. HTN-labile--con't current treatment with prn hydralazine--monitor  3. Bradycardia--home lopressor held--AM EKG--fungal sinusitis-consider diflucan/antifungal if QTc wnl otherwise just observe  4. Vertigo-improved with antivert  5. Thrush--nystatin swish and swallow  6. CKD--stable--monitor  7. PT/OT eval and treat--discharge planning  8.  See orders    Electronically signed by Galen AMADOR, SURAJ-BC on 12/29/2017 at 8:52 PM    Hospitalist

## 2017-12-31 LAB
ABSOLUTE EOS #: 0.3 K/UL (ref 0–0.4)
ABSOLUTE IMMATURE GRANULOCYTE: ABNORMAL K/UL (ref 0–0.3)
ABSOLUTE LYMPH #: 2.5 K/UL (ref 1–4.8)
ABSOLUTE MONO #: 0.7 K/UL (ref 0.1–1.2)
ANION GAP SERPL CALCULATED.3IONS-SCNC: 11 MMOL/L (ref 9–17)
ANION GAP SERPL CALCULATED.3IONS-SCNC: 9 MMOL/L (ref 9–17)
BASOPHILS # BLD: 1 % (ref 0–1)
BASOPHILS ABSOLUTE: 0.1 K/UL (ref 0–0.2)
BUN BLDV-MCNC: 27 MG/DL (ref 8–23)
BUN BLDV-MCNC: 28 MG/DL (ref 8–23)
BUN/CREAT BLD: 16 (ref 9–20)
BUN/CREAT BLD: 21 (ref 9–20)
CALCIUM SERPL-MCNC: 9.3 MG/DL (ref 8.6–10.4)
CALCIUM SERPL-MCNC: 9.4 MG/DL (ref 8.6–10.4)
CHLORIDE BLD-SCNC: 105 MMOL/L (ref 98–107)
CHLORIDE BLD-SCNC: 107 MMOL/L (ref 98–107)
CO2: 25 MMOL/L (ref 20–31)
CO2: 26 MMOL/L (ref 20–31)
CREAT SERPL-MCNC: 1.36 MG/DL (ref 0.5–0.9)
CREAT SERPL-MCNC: 1.65 MG/DL (ref 0.5–0.9)
DIFFERENTIAL TYPE: ABNORMAL
EKG ATRIAL RATE: 61 BPM
EKG P AXIS: 75 DEGREES
EKG P-R INTERVAL: 186 MS
EKG Q-T INTERVAL: 470 MS
EKG QRS DURATION: 86 MS
EKG QTC CALCULATION (BAZETT): 473 MS
EKG R AXIS: 33 DEGREES
EKG T AXIS: 19 DEGREES
EKG VENTRICULAR RATE: 61 BPM
EOSINOPHILS RELATIVE PERCENT: 4 % (ref 1–7)
GFR AFRICAN AMERICAN: 35 ML/MIN
GFR AFRICAN AMERICAN: 44 ML/MIN
GFR NON-AFRICAN AMERICAN: 29 ML/MIN
GFR NON-AFRICAN AMERICAN: 37 ML/MIN
GFR SERPL CREATININE-BSD FRML MDRD: ABNORMAL ML/MIN/{1.73_M2}
GLUCOSE BLD-MCNC: 140 MG/DL (ref 70–99)
GLUCOSE BLD-MCNC: 84 MG/DL (ref 70–99)
HCT VFR BLD CALC: 29.2 % (ref 36–46)
HEMOGLOBIN: 9.4 G/DL (ref 12–16)
IMMATURE GRANULOCYTES: ABNORMAL %
LYMPHOCYTES # BLD: 42 % (ref 16–46)
MCH RBC QN AUTO: 30.6 PG (ref 26–34)
MCHC RBC AUTO-ENTMCNC: 32.4 G/DL (ref 31–37)
MCV RBC AUTO: 94.6 FL (ref 80–100)
MONOCYTES # BLD: 12 % (ref 4–11)
PDW BLD-RTO: 14.3 % (ref 11–14.5)
PLATELET # BLD: 155 K/UL (ref 140–450)
PLATELET ESTIMATE: ABNORMAL
PMV BLD AUTO: 8.9 FL (ref 6–12)
POTASSIUM SERPL-SCNC: 5.5 MMOL/L (ref 3.7–5.3)
POTASSIUM SERPL-SCNC: 5.9 MMOL/L (ref 3.7–5.3)
RBC # BLD: 3.08 M/UL (ref 4–5.2)
RBC # BLD: ABNORMAL 10*6/UL
SEG NEUTROPHILS: 41 % (ref 43–77)
SEGMENTED NEUTROPHILS ABSOLUTE COUNT: 2.4 K/UL (ref 1.8–7.7)
SODIUM BLD-SCNC: 141 MMOL/L (ref 135–144)
SODIUM BLD-SCNC: 142 MMOL/L (ref 135–144)
WBC # BLD: 5.9 K/UL (ref 3.5–11)
WBC # BLD: ABNORMAL 10*3/UL

## 2017-12-31 PROCEDURE — 6370000000 HC RX 637 (ALT 250 FOR IP): Performed by: NURSE PRACTITIONER

## 2017-12-31 PROCEDURE — 36415 COLL VENOUS BLD VENIPUNCTURE: CPT

## 2017-12-31 PROCEDURE — 80048 BASIC METABOLIC PNL TOTAL CA: CPT

## 2017-12-31 PROCEDURE — 6360000002 HC RX W HCPCS: Performed by: FAMILY MEDICINE

## 2017-12-31 PROCEDURE — 2580000003 HC RX 258: Performed by: FAMILY MEDICINE

## 2017-12-31 PROCEDURE — 94761 N-INVAS EAR/PLS OXIMETRY MLT: CPT

## 2017-12-31 PROCEDURE — 6370000000 HC RX 637 (ALT 250 FOR IP): Performed by: FAMILY MEDICINE

## 2017-12-31 PROCEDURE — 99232 SBSQ HOSP IP/OBS MODERATE 35: CPT | Performed by: FAMILY MEDICINE

## 2017-12-31 PROCEDURE — 85025 COMPLETE CBC W/AUTO DIFF WBC: CPT

## 2017-12-31 PROCEDURE — 97116 GAIT TRAINING THERAPY: CPT | Performed by: PHYSICAL THERAPY ASSISTANT

## 2017-12-31 PROCEDURE — 2060000000 HC ICU INTERMEDIATE R&B

## 2017-12-31 PROCEDURE — 2580000003 HC RX 258: Performed by: NURSE PRACTITIONER

## 2017-12-31 RX ORDER — SODIUM POLYSTYRENE SULFONATE 15 G/60ML
15 SUSPENSION ORAL; RECTAL ONCE
Status: COMPLETED | OUTPATIENT
Start: 2017-12-31 | End: 2017-12-31

## 2017-12-31 RX ORDER — SODIUM CHLORIDE 9 MG/ML
INJECTION, SOLUTION INTRAVENOUS CONTINUOUS
Status: DISCONTINUED | OUTPATIENT
Start: 2017-12-31 | End: 2018-01-02 | Stop reason: HOSPADM

## 2017-12-31 RX ADMIN — CIPROFLOXACIN 200 MG: 2 INJECTION INTRAVENOUS at 12:07

## 2017-12-31 RX ADMIN — FERROUS SULFATE TAB 325 MG (65 MG ELEMENTAL FE) 325 MG: 325 (65 FE) TAB at 16:40

## 2017-12-31 RX ADMIN — DONEPEZIL HYDROCHLORIDE 10 MG: 5 TABLET, FILM COATED ORAL at 20:50

## 2017-12-31 RX ADMIN — NYSTATIN 500000 UNITS: 100000 SUSPENSION ORAL at 08:46

## 2017-12-31 RX ADMIN — PANTOPRAZOLE SODIUM 40 MG: 40 TABLET, DELAYED RELEASE ORAL at 20:50

## 2017-12-31 RX ADMIN — NYSTATIN 500000 UNITS: 100000 SUSPENSION ORAL at 20:50

## 2017-12-31 RX ADMIN — FERROUS SULFATE TAB 325 MG (65 MG ELEMENTAL FE) 325 MG: 325 (65 FE) TAB at 08:46

## 2017-12-31 RX ADMIN — Medication 10 ML: at 08:46

## 2017-12-31 RX ADMIN — CIPROFLOXACIN 200 MG: 2 INJECTION INTRAVENOUS at 02:04

## 2017-12-31 RX ADMIN — Medication 10 ML: at 02:04

## 2017-12-31 RX ADMIN — Medication 1 CAPSULE: at 20:50

## 2017-12-31 RX ADMIN — AMIODARONE HYDROCHLORIDE 200 MG: 200 TABLET ORAL at 08:45

## 2017-12-31 RX ADMIN — CYANOCOBALAMIN TAB 1000 MCG 1000 MCG: 1000 TAB at 08:49

## 2017-12-31 RX ADMIN — MECLIZINE 25 MG: 12.5 TABLET ORAL at 12:07

## 2017-12-31 RX ADMIN — NYSTATIN 500000 UNITS: 100000 SUSPENSION ORAL at 16:40

## 2017-12-31 RX ADMIN — SODIUM CHLORIDE: 9 INJECTION, SOLUTION INTRAVENOUS at 12:07

## 2017-12-31 RX ADMIN — SODIUM POLYSTYRENE SULFONATE 15 G: 15 SUSPENSION ORAL; RECTAL at 20:50

## 2017-12-31 RX ADMIN — PSYLLIUM HUSK 0.52 G: 20 CAPSULE ORAL at 08:45

## 2017-12-31 RX ADMIN — NYSTATIN 500000 UNITS: 100000 SUSPENSION ORAL at 12:07

## 2017-12-31 RX ADMIN — MECLIZINE 25 MG: 12.5 TABLET ORAL at 16:40

## 2017-12-31 RX ADMIN — Medication 1 CAPSULE: at 12:07

## 2017-12-31 RX ADMIN — DOCUSATE SODIUM 100 MG: 100 CAPSULE, LIQUID FILLED ORAL at 08:45

## 2017-12-31 RX ADMIN — Medication 1 CAPSULE: at 08:46

## 2017-12-31 RX ADMIN — DOCUSATE SODIUM 100 MG: 100 CAPSULE, LIQUID FILLED ORAL at 20:50

## 2017-12-31 RX ADMIN — MECLIZINE 25 MG: 12.5 TABLET ORAL at 06:11

## 2017-12-31 ASSESSMENT — PAIN SCALES - GENERAL: PAINLEVEL_OUTOF10: 0

## 2017-12-31 NOTE — PROGRESS NOTES
Physical Therapy  Facility/Department: [facility]  PROGRESSIVE CARE  Daily Treatment Note  NAME: Amita Grimes  : 1927  MRN: [id number]    Date of Service: 2017    Patient Diagnosis(es):   Patient Active Problem List    Diagnosis Date Noted    Near syncope 2017    Dementia     Hypertension     Coronary artery disease involving native coronary artery of native heart     Anemia     Chronic atrial fibrillation (HCC)     Hypotensive episode     Vertigo     Orthostasis     Vasomotor rhinitis     Diverticulosis     GERD (gastroesophageal reflux disease)     Heartburn     Diabetes mellitus, type 2 ([address]     Stroke ([address]     Renal disease     Atrophic vaginitis     Osteoporosis        Past Medical History:   Diagnosis Date    Aden[address] [address]     history of focal adenocarcinoma atypical hyperplasia     Anemia     Atrophic vaginitis     history of     Chronic atrial fibrillation (HCC)     CKD (chronic kidney disease), stage III     Coronary artery disease     Dementia     Diabetes mellitus, type 2 (HCC)     Diverticulosis     Fatigue     GERD (gastroesophageal reflux disease)     GI bleed     Heartburn     Herpes zoster     history of     Hypercholesterolemia     Hypertension     Hypotensive episode     history of     Left cataract     Night terrors, adult     Orthostasis     Osteoporosis     Renal disease     chronic stage 3    Stroke ([address]     Vasomotor rhinitis     history of     Vertigo     history of      Past Surgical History:   Procedure Laterality Date    CATARACT REMOVAL WITH IMPLANT Left      SECTION      COLONOSCOPY  2006    CORONARY ARTERY BYPASS GRAFT      x4 in     CYST REMOVAL  1979    back    DILATION AND CURETTAGE OF UTERUS  1977    MMR    OTHER SURGICAL HISTORY      CABG x 4     OTHER SURGICAL HISTORY  10/24/2012    removal of implantable loop recorder     GER AND BSO         Restrictions     Subjective

## 2017-12-31 NOTE — PROGRESS NOTES
Sounds Present, Soft, Non-tender, without Guarding or Rebound Tenderness and No Masses  : Not examined  EXT/Skin: No Edema, No Cyanosis and No Clubbing  Neuro: awake and alert, generalized weakness, [place], previous CVA with right visual field cut at baseline, gait abnormality at baseline (uses walker at veers to her right at baseline per daughter's report) and Dementia at baseline        Assessment:    Active Problems:    Hypertension    Vertigo    Near syncope        Plan:  1. UTI--IV cipro  2. HTN-labile--con't current treatment with prn hydralazine--monitor  3. Bradycardia-stable with home lopressor held  4. Vertigo-improved with antivert-monitor--fall precautions  5. Thrush--con't nystatin swish and swallow  6. CKD--monitor renal function  7. PT/OT therapies--discharge planning  8.  See orders    Electronically signed by Elizabeth ADAMSC, FNP-BC on 12/30/2017 at 8:17 PM    Hospitalist

## 2018-01-01 ENCOUNTER — TELEPHONE (OUTPATIENT)
Dept: ADMINISTRATIVE | Age: 83
End: 2018-01-01

## 2018-01-01 ENCOUNTER — CARE COORDINATION (OUTPATIENT)
Dept: CASE MANAGEMENT | Age: 83
End: 2018-01-01

## 2018-01-01 LAB
ABSOLUTE EOS #: 0.2 K/UL (ref 0–0.4)
ABSOLUTE IMMATURE GRANULOCYTE: ABNORMAL K/UL (ref 0–0.3)
ABSOLUTE LYMPH #: 1.8 K/UL (ref 1–4.8)
ABSOLUTE MONO #: 0.5 K/UL (ref 0.1–1.2)
ANION GAP SERPL CALCULATED.3IONS-SCNC: 10 MMOL/L (ref 9–17)
ANION GAP SERPL CALCULATED.3IONS-SCNC: 12 MMOL/L (ref 9–17)
BASOPHILS # BLD: 1 % (ref 0–1)
BASOPHILS ABSOLUTE: 0 K/UL (ref 0–0.2)
BUN BLDV-MCNC: 28 MG/DL (ref 8–23)
BUN BLDV-MCNC: 30 MG/DL (ref 8–23)
BUN/CREAT BLD: 20 (ref 9–20)
BUN/CREAT BLD: 21 (ref 9–20)
CALCIUM SERPL-MCNC: 8.8 MG/DL (ref 8.6–10.4)
CALCIUM SERPL-MCNC: 9 MG/DL (ref 8.6–10.4)
CHLORIDE BLD-SCNC: 108 MMOL/L (ref 98–107)
CHLORIDE BLD-SCNC: 108 MMOL/L (ref 98–107)
CO2: 22 MMOL/L (ref 20–31)
CO2: 26 MMOL/L (ref 20–31)
CREAT SERPL-MCNC: 1.43 MG/DL (ref 0.5–0.9)
CREAT SERPL-MCNC: 1.46 MG/DL (ref 0.5–0.9)
DIFFERENTIAL TYPE: ABNORMAL
EOSINOPHILS RELATIVE PERCENT: 4 % (ref 1–7)
GFR AFRICAN AMERICAN: 41 ML/MIN
GFR AFRICAN AMERICAN: 42 ML/MIN
GFR NON-AFRICAN AMERICAN: 34 ML/MIN
GFR NON-AFRICAN AMERICAN: 34 ML/MIN
GFR SERPL CREATININE-BSD FRML MDRD: ABNORMAL ML/MIN/{1.73_M2}
GLUCOSE BLD-MCNC: 158 MG/DL (ref 70–99)
GLUCOSE BLD-MCNC: 89 MG/DL (ref 70–99)
HCT VFR BLD CALC: 30.9 % (ref 36–46)
HEMOGLOBIN: 9.6 G/DL (ref 12–16)
IMMATURE GRANULOCYTES: ABNORMAL %
LYMPHOCYTES # BLD: 33 % (ref 16–46)
MCH RBC QN AUTO: 30.8 PG (ref 26–34)
MCHC RBC AUTO-ENTMCNC: 31 G/DL (ref 31–37)
MCV RBC AUTO: 99.3 FL (ref 80–100)
MONOCYTES # BLD: 10 % (ref 4–11)
PDW BLD-RTO: 15.3 % (ref 11–14.5)
PLATELET # BLD: 156 K/UL (ref 140–450)
PLATELET ESTIMATE: ABNORMAL
PMV BLD AUTO: 9.3 FL (ref 6–12)
POTASSIUM SERPL-SCNC: 5.4 MMOL/L (ref 3.7–5.3)
POTASSIUM SERPL-SCNC: 5.4 MMOL/L (ref 3.7–5.3)
RBC # BLD: 3.11 M/UL (ref 4–5.2)
RBC # BLD: ABNORMAL 10*6/UL
SEG NEUTROPHILS: 52 % (ref 43–77)
SEGMENTED NEUTROPHILS ABSOLUTE COUNT: 2.8 K/UL (ref 1.8–7.7)
SODIUM BLD-SCNC: 142 MMOL/L (ref 135–144)
SODIUM BLD-SCNC: 144 MMOL/L (ref 135–144)
WBC # BLD: 5.3 K/UL (ref 3.5–11)
WBC # BLD: ABNORMAL 10*3/UL

## 2018-01-01 PROCEDURE — 6370000000 HC RX 637 (ALT 250 FOR IP): Performed by: FAMILY MEDICINE

## 2018-01-01 PROCEDURE — 2580000003 HC RX 258: Performed by: FAMILY MEDICINE

## 2018-01-01 PROCEDURE — 80048 BASIC METABOLIC PNL TOTAL CA: CPT

## 2018-01-01 PROCEDURE — 85025 COMPLETE CBC W/AUTO DIFF WBC: CPT

## 2018-01-01 PROCEDURE — 2060000000 HC ICU INTERMEDIATE R&B

## 2018-01-01 PROCEDURE — 36415 COLL VENOUS BLD VENIPUNCTURE: CPT

## 2018-01-01 PROCEDURE — 6360000002 HC RX W HCPCS: Performed by: FAMILY MEDICINE

## 2018-01-01 PROCEDURE — 6370000000 HC RX 637 (ALT 250 FOR IP): Performed by: NURSE PRACTITIONER

## 2018-01-01 PROCEDURE — 97116 GAIT TRAINING THERAPY: CPT | Performed by: PHYSICAL THERAPY ASSISTANT

## 2018-01-01 PROCEDURE — 99232 SBSQ HOSP IP/OBS MODERATE 35: CPT | Performed by: FAMILY MEDICINE

## 2018-01-01 PROCEDURE — 94761 N-INVAS EAR/PLS OXIMETRY MLT: CPT

## 2018-01-01 RX ORDER — SODIUM POLYSTYRENE SULFONATE 15 G/60ML
15 SUSPENSION ORAL; RECTAL ONCE
Status: COMPLETED | OUTPATIENT
Start: 2018-01-01 | End: 2018-01-01

## 2018-01-01 RX ORDER — PANTOPRAZOLE SODIUM 40 MG/1
TABLET, DELAYED RELEASE ORAL
Qty: 30 TABLET | Refills: 0 | Status: SHIPPED | OUTPATIENT
Start: 2018-01-01

## 2018-01-01 RX ORDER — SODIUM POLYSTYRENE SULFONATE 15 G/60ML
15 SUSPENSION ORAL; RECTAL ONCE
Status: DISCONTINUED | OUTPATIENT
Start: 2018-01-02 | End: 2018-01-02 | Stop reason: HOSPADM

## 2018-01-01 RX ADMIN — CIPROFLOXACIN 200 MG: 2 INJECTION INTRAVENOUS at 13:21

## 2018-01-01 RX ADMIN — PANTOPRAZOLE SODIUM 40 MG: 40 TABLET, DELAYED RELEASE ORAL at 20:04

## 2018-01-01 RX ADMIN — SODIUM POLYSTYRENE SULFONATE 15 G: 15 SUSPENSION ORAL; RECTAL at 09:33

## 2018-01-01 RX ADMIN — CIPROFLOXACIN 200 MG: 2 INJECTION INTRAVENOUS at 02:28

## 2018-01-01 RX ADMIN — NYSTATIN 500000 UNITS: 100000 SUSPENSION ORAL at 17:18

## 2018-01-01 RX ADMIN — SODIUM CHLORIDE: 9 INJECTION, SOLUTION INTRAVENOUS at 12:42

## 2018-01-01 RX ADMIN — DOCUSATE SODIUM 100 MG: 100 CAPSULE, LIQUID FILLED ORAL at 08:25

## 2018-01-01 RX ADMIN — MECLIZINE 25 MG: 12.5 TABLET ORAL at 23:15

## 2018-01-01 RX ADMIN — AMIODARONE HYDROCHLORIDE 200 MG: 200 TABLET ORAL at 08:25

## 2018-01-01 RX ADMIN — FERROUS SULFATE TAB 325 MG (65 MG ELEMENTAL FE) 325 MG: 325 (65 FE) TAB at 17:18

## 2018-01-01 RX ADMIN — NYSTATIN 500000 UNITS: 100000 SUSPENSION ORAL at 20:04

## 2018-01-01 RX ADMIN — DONEPEZIL HYDROCHLORIDE 10 MG: 5 TABLET, FILM COATED ORAL at 20:04

## 2018-01-01 RX ADMIN — MECLIZINE 25 MG: 12.5 TABLET ORAL at 07:06

## 2018-01-01 RX ADMIN — NYSTATIN 500000 UNITS: 100000 SUSPENSION ORAL at 13:20

## 2018-01-01 RX ADMIN — Medication 1 CAPSULE: at 13:20

## 2018-01-01 RX ADMIN — MECLIZINE 25 MG: 12.5 TABLET ORAL at 17:18

## 2018-01-01 RX ADMIN — NYSTATIN 500000 UNITS: 100000 SUSPENSION ORAL at 08:25

## 2018-01-01 RX ADMIN — CYANOCOBALAMIN TAB 1000 MCG 1000 MCG: 1000 TAB at 08:25

## 2018-01-01 RX ADMIN — MECLIZINE 25 MG: 12.5 TABLET ORAL at 11:45

## 2018-01-01 RX ADMIN — DOCUSATE SODIUM 100 MG: 100 CAPSULE, LIQUID FILLED ORAL at 20:04

## 2018-01-01 RX ADMIN — FERROUS SULFATE TAB 325 MG (65 MG ELEMENTAL FE) 325 MG: 325 (65 FE) TAB at 08:25

## 2018-01-01 RX ADMIN — PSYLLIUM HUSK 0.52 G: 20 CAPSULE ORAL at 08:25

## 2018-01-01 RX ADMIN — Medication 1 CAPSULE: at 08:25

## 2018-01-01 RX ADMIN — Medication 1 CAPSULE: at 20:04

## 2018-01-01 RX ADMIN — SODIUM POLYSTYRENE SULFONATE 15 G: 15 SUSPENSION ORAL; RECTAL at 17:18

## 2018-01-01 ASSESSMENT — PAIN SCALES - GENERAL
PAINLEVEL_OUTOF10: 0
PAINLEVEL_OUTOF10: 0

## 2018-01-01 NOTE — PROGRESS NOTES
rhythm  GI/Abdomen: Bowel Sounds Present, Soft, Non-tender, without Guarding or Rebound Tenderness and No Masses  : Not examined  EXT/Skin: No Edema, No Cyanosis and No Clubbing  Neuro: awake and alert, generalized weakness, [place], previous CVA with right visual field cut at baseline, gait abnormality at baseline (uses walker at veers to her right at baseline per daughter's report) and Dementia at baseline        Assessment:    Active Problems:    Hypertension    Vertigo    Near syncope        Plan:  1. Hyperkalemia--IV hydration--cozaar held--kayexelate--recheck in AM  2. CKD--gentle IV fluids--encourage PO intake--monitor renal function  3. UTI--con't IV cipro  4. HTN-improved--monitor--cozaar held and hydralazine added  5. Bradycardia-improved--monitor  6. Vertigo-improved with antivert-monitor--fall precautions  7. PT/OT therapies  8.  See orders    Electronically signed by Corby AMADOR, JENNY on 12/31/2017 at 9:34 PM    Hospitalist

## 2018-01-01 NOTE — PROGRESS NOTES
Hospitalist Progress Note  1/1/2018 3:04 PM  Subjective:   Admit Date: 12/28/2017  PCP: Vdea Garrison MD     [facility]    Interval History: pt doing better, labs are better except still elevated k. Diet: DIET CARDIAC; Low Sodium (2 GM); No Caffeine                                ip days:4  Medications:   Scheduled Meds:   sodium polystyrene  15 g Oral Once    [START ON 1/2/2018] sodium polystyrene  15 g Oral Once    ciprofloxacin  200 mg Intravenous Q12H    lactobacillus  1 capsule Oral TID    amiodarone  200 mg Oral Daily    vitamin B-12  1,000 mcg Oral Daily    docusate sodium  100 mg Oral BID    donepezil  10 mg Oral Nightly    ferrous sulfate  325 mg Oral BID WC    psyllium  1 capsule Oral Daily    pantoprazole  40 mg Oral Nightly    sodium chloride flush  10 mL Intravenous 2 times per day    meclizine  25 mg Oral 4 times per day    nystatin  5 mL Oral 4x Daily     Continuous Infusions:   sodium chloride 75 mL/hr at 01/01/18 1242     PRN Meds:.sodium chloride flush, acetaminophen, ondansetron, hydrALAZINE     CBC:   Recent Labs      12/30/17   0607  12/31/17   0600  01/01/18   0527   WBC  6.2  5.9  5.3   HGB  9.7*  9.4*  9.6*   PLT  161  155  156     BMP:    Recent Labs      12/31/17   1754  01/01/18   0527  01/01/18   1350   NA  141  142  144   K  5.9*  5.4*  5.4*   CL  105  108*  108*   CO2  25  22  26   BUN  28*  30*  28*   CREATININE  1.36*  1.46*  1.43*   GLUCOSE  140*  89  158*     Hepatic: No results for input(s): AST, ALT, ALB, BILITOT, ALKPHOS in the last 72 hours. Troponin: No results for input(s): TROPONINI in the last 72 hours. BNP: No results for input(s): BNP in the last 72 hours. Lipids: No results for input(s): CHOL, HDL in the last 72 hours. Invalid input(s): LDLCALCU  INR: No results for input(s): INR in the last 72 hours.     Objective:   Vitals: BP (!) 119/53   Pulse 67   Temp 96.8 °F (36 °C) (Tympanic)   Resp 16   Ht 5' 2\" (1.575 m)   Wt 114 lb 11 oz (52 kg) 12/28/2017  EXAMINATION: SINGLE VIEW OF THE CHEST 12/28/2017 5:02 pm COMPARISON: 12/29/2012 HISTORY: ORDERING SYSTEM PROVIDED HISTORY: weakness TECHNOLOGIST PROVIDED HISTORY: Reason for exam:->weakness Ordering Physician Provided Reason for Exam: Altered mental status; weight loss; decreased appetite; hx of stroke; hx of quadruple bypass Acuity: Acute Type of Exam: Initial FINDINGS: Status post median sternotomy. The heart size is stable. Linear atelectatic changes in the right upper lobe. No other airspace disease. No pleural effusion. No pulmonary edema. Stable chest demonstrating linear atelectatic changes or scarring in the right upper lobe       Assessment :   1. Syncope/better/off b blocker and  And lorsartan  2. Hyperkalemia/kayexlate     Plan:   1. Will check k in am  2. Possible d/c in am    Patient Active Problem List:     Hypertension     Coronary artery disease involving native coronary artery of native heart     Anemia     Chronic atrial fibrillation (HCC)     Hypotensive episode     Vertigo     Orthostasis     Vasomotor rhinitis     Diverticulosis     GERD (gastroesophageal reflux disease)     Heartburn     Diabetes mellitus, type 2 (HCC)     Stroke ([facility] [address]     Renal disease     Atrophic vaginitis     Osteoporosis     Dementia     Near syncope      Anticipated Disposition upon discharge: [] Home                                                                         [] Home with Home Health                                                                         [x] Located within [facility]                                                                         [] [address]      Patient is admitted as inpatient status because of co-morbidities listed above, severity of signs and symptoms as outlined, requirement for current medical therapies and most importantly because of direct risk to patient if care not provided in a hospital setting.           Carlito Chatterjee,

## 2018-01-01 NOTE — PROGRESS NOTES
General  Family / Caregiver Present: Yes (Daughter)  General Comment  Comments: Pt received up in chair. Awake, alert and agreeable to PT. Denied pain and dizziness. Pain Screening  Patient Currently in Pain: No  Vital Signs  Patient Currently in Pain: No       Orientation  Orientation  Overall Orientation Status: Within Normal Limits  Objective      Transfers  Sit to Stand: Contact guard assistance  Stand to sit: Contact guard assistance  Ambulation 1  Surface: level tile  Device: Rolling Walker  Assistance: Contact guard assistance  Quality of Gait: Slow and steady. No LOB, SOB or dizziness reported. Distance: 48' x 4  Comments: Reported fatigue post gait.      Balance  Sitting - Static: Good  Sitting - Dynamic: Good  Standing - Static: Good  Standing - Dynamic: Fair      Assessment      REQUIRES PT FOLLOW UP: Yes  Activity Tolerance  Activity Tolerance: Patient limited by fatigue       Discharge Recommendations:  Continue to assess pending progress    G-Code     OutComes Score     AM-PAC Score     Goals  Short term goals  Time Frame for Short term goals: 3days   Short term goal 1: Perform Left CRM   Short term goal 2: Bed Mobility SBA   Short term goal 3: Transfers with SBA  Short term goal 4: Amb x 50 ft with CGA and use of RW   Patient Goals   Patient goals : Jaspreet Mcfarland  Times per day: Daily  Current Treatment Recommendations: Gait Training, Transfer Training  Safety Devices  Type of devices: Call light within reach, Chair alarm in place, Gait belt, Left in chair (Daughter present)     Therapy Time   Individual Concurrent Group Co-treatment   Time In 1055         Time Out 1110         Minutes [address] [place], [place]

## 2018-01-02 VITALS
RESPIRATION RATE: 16 BRPM | SYSTOLIC BLOOD PRESSURE: 152 MMHG | TEMPERATURE: 98 F | HEIGHT: 62 IN | HEART RATE: 70 BPM | OXYGEN SATURATION: 94 % | BODY MASS INDEX: 21.1 KG/M2 | DIASTOLIC BLOOD PRESSURE: 65 MMHG | WEIGHT: 114.69 LBS

## 2018-01-02 LAB
ABSOLUTE EOS #: 0.2 K/UL (ref 0–0.4)
ABSOLUTE IMMATURE GRANULOCYTE: ABNORMAL K/UL (ref 0–0.3)
ABSOLUTE LYMPH #: 1.6 K/UL (ref 1–4.8)
ABSOLUTE MONO #: 0.7 K/UL (ref 0.1–1.2)
ANION GAP SERPL CALCULATED.3IONS-SCNC: 10 MMOL/L (ref 9–17)
BASOPHILS # BLD: 1 % (ref 0–1)
BASOPHILS ABSOLUTE: 0.1 K/UL (ref 0–0.2)
BUN BLDV-MCNC: 23 MG/DL (ref 8–23)
BUN/CREAT BLD: 21 (ref 9–20)
CALCIUM SERPL-MCNC: 8.5 MG/DL (ref 8.6–10.4)
CHLORIDE BLD-SCNC: 108 MMOL/L (ref 98–107)
CO2: 26 MMOL/L (ref 20–31)
CREAT SERPL-MCNC: 1.09 MG/DL (ref 0.5–0.9)
DIFFERENTIAL TYPE: ABNORMAL
EOSINOPHILS RELATIVE PERCENT: 4 % (ref 1–7)
GFR AFRICAN AMERICAN: 57 ML/MIN
GFR NON-AFRICAN AMERICAN: 47 ML/MIN
GFR SERPL CREATININE-BSD FRML MDRD: ABNORMAL ML/MIN/{1.73_M2}
GFR SERPL CREATININE-BSD FRML MDRD: ABNORMAL ML/MIN/{1.73_M2}
GLUCOSE BLD-MCNC: 87 MG/DL (ref 70–99)
HCT VFR BLD CALC: 27.8 % (ref 36–46)
HEMOGLOBIN: 9 G/DL (ref 12–16)
IMMATURE GRANULOCYTES: ABNORMAL %
LYMPHOCYTES # BLD: 26 % (ref 16–46)
MCH RBC QN AUTO: 30.6 PG (ref 26–34)
MCHC RBC AUTO-ENTMCNC: 32.4 G/DL (ref 31–37)
MCV RBC AUTO: 94.6 FL (ref 80–100)
MONOCYTES # BLD: 12 % (ref 4–11)
PDW BLD-RTO: 14.4 % (ref 11–14.5)
PLATELET # BLD: 147 K/UL (ref 140–450)
PLATELET ESTIMATE: ABNORMAL
PMV BLD AUTO: 9.5 FL (ref 6–12)
POTASSIUM SERPL-SCNC: 4.2 MMOL/L (ref 3.7–5.3)
RBC # BLD: 2.93 M/UL (ref 4–5.2)
RBC # BLD: ABNORMAL 10*6/UL
SEG NEUTROPHILS: 57 % (ref 43–77)
SEGMENTED NEUTROPHILS ABSOLUTE COUNT: 3.6 K/UL (ref 1.8–7.7)
SODIUM BLD-SCNC: 144 MMOL/L (ref 135–144)
WBC # BLD: 6.2 K/UL (ref 3.5–11)
WBC # BLD: ABNORMAL 10*3/UL

## 2018-01-02 PROCEDURE — G8989 SELF CARE D/C STATUS: HCPCS | Performed by: OCCUPATIONAL THERAPIST

## 2018-01-02 PROCEDURE — 6360000002 HC RX W HCPCS: Performed by: FAMILY MEDICINE

## 2018-01-02 PROCEDURE — G8988 SELF CARE GOAL STATUS: HCPCS | Performed by: OCCUPATIONAL THERAPIST

## 2018-01-02 PROCEDURE — 2580000003 HC RX 258: Performed by: FAMILY MEDICINE

## 2018-01-02 PROCEDURE — 6370000000 HC RX 637 (ALT 250 FOR IP): Performed by: FAMILY MEDICINE

## 2018-01-02 PROCEDURE — 36415 COLL VENOUS BLD VENIPUNCTURE: CPT

## 2018-01-02 PROCEDURE — 97166 OT EVAL MOD COMPLEX 45 MIN: CPT | Performed by: OCCUPATIONAL THERAPIST

## 2018-01-02 PROCEDURE — 80048 BASIC METABOLIC PNL TOTAL CA: CPT

## 2018-01-02 PROCEDURE — 6370000000 HC RX 637 (ALT 250 FOR IP): Performed by: NURSE PRACTITIONER

## 2018-01-02 PROCEDURE — 97116 GAIT TRAINING THERAPY: CPT | Performed by: PHYSICAL THERAPY ASSISTANT

## 2018-01-02 PROCEDURE — 99238 HOSP IP/OBS DSCHRG MGMT 30/<: CPT | Performed by: INTERNAL MEDICINE

## 2018-01-02 PROCEDURE — 85025 COMPLETE CBC W/AUTO DIFF WBC: CPT

## 2018-01-02 PROCEDURE — G8987 SELF CARE CURRENT STATUS: HCPCS | Performed by: OCCUPATIONAL THERAPIST

## 2018-01-02 PROCEDURE — 94760 N-INVAS EAR/PLS OXIMETRY 1: CPT

## 2018-01-02 PROCEDURE — 97110 THERAPEUTIC EXERCISES: CPT | Performed by: PHYSICAL THERAPY ASSISTANT

## 2018-01-02 RX ORDER — LACTOBACILLUS RHAMNOSUS GG 10B CELL
1 CAPSULE ORAL 3 TIMES DAILY
DISCHARGE
Start: 2018-01-02 | End: 2018-01-01

## 2018-01-02 RX ORDER — ACETAMINOPHEN 325 MG/1
650 TABLET ORAL EVERY 4 HOURS PRN
Qty: 120 TABLET | Refills: 0 | COMMUNITY
Start: 2018-01-02

## 2018-01-02 RX ADMIN — AMIODARONE HYDROCHLORIDE 200 MG: 200 TABLET ORAL at 08:45

## 2018-01-02 RX ADMIN — CIPROFLOXACIN 200 MG: 2 INJECTION INTRAVENOUS at 02:05

## 2018-01-02 RX ADMIN — FERROUS SULFATE TAB 325 MG (65 MG ELEMENTAL FE) 325 MG: 325 (65 FE) TAB at 08:45

## 2018-01-02 RX ADMIN — SODIUM CHLORIDE: 9 INJECTION, SOLUTION INTRAVENOUS at 02:05

## 2018-01-02 RX ADMIN — Medication 1 CAPSULE: at 08:45

## 2018-01-02 RX ADMIN — MECLIZINE 25 MG: 12.5 TABLET ORAL at 06:42

## 2018-01-02 RX ADMIN — NYSTATIN 500000 UNITS: 100000 SUSPENSION ORAL at 08:45

## 2018-01-02 RX ADMIN — CYANOCOBALAMIN TAB 1000 MCG 1000 MCG: 1000 TAB at 08:45

## 2018-01-02 RX ADMIN — DOCUSATE SODIUM 100 MG: 100 CAPSULE, LIQUID FILLED ORAL at 08:45

## 2018-01-02 RX ADMIN — MECLIZINE 25 MG: 12.5 TABLET ORAL at 11:47

## 2018-01-02 RX ADMIN — PSYLLIUM HUSK 0.52 G: 20 CAPSULE ORAL at 08:45

## 2018-01-02 ASSESSMENT — PAIN SCALES - GENERAL
PAINLEVEL_OUTOF10: 0

## 2018-01-02 NOTE — PROGRESS NOTES
Eric Brown is a 80 y.o. female patient.     Current Facility-Administered Medications   Medication Dose Route Frequency Provider Last Rate Last Dose    [START ON 1/2/2018] sodium polystyrene (KAYEXALATE) 15 GM/60ML suspension 15 g  15 g Oral Once Kristy Martin MD        0.9 % sodium chloride infusion   Intravenous Continuous Kristy Martin MD 75 mL/hr at 01/01/18 1242      ciprofloxacin (CIPRO) IVPB 200 mg  200 mg Intravenous Q12H Kristy Martin MD   Stopped at 01/01/18 1421    lactobacillus (CULTURELLE) capsule 1 capsule  1 capsule Oral TID Kristy Martin MD   1 capsule at 01/01/18 2004    amiodarone (CORDARONE) tablet 200 mg  200 mg Oral Daily Iver Kemps, CNP   200 mg at 01/01/18 0825    vitamin B-12 (CYANOCOBALAMIN) tablet 1,000 mcg  1,000 mcg Oral Daily Iver Kemps, CNP   1,000 mcg at 01/01/18 0825    docusate sodium (COLACE) capsule 100 mg  100 mg Oral BID Iver Kemps, CNP   100 mg at 01/01/18 2004    donepezil (ARICEPT) tablet 10 mg  10 mg Oral Nightly Iver Kemps, CNP   10 mg at 01/01/18 2004    ferrous sulfate tablet 325 mg  325 mg Oral BID WC Iver Kemps, CNP   325 mg at 01/01/18 1718    psyllium capsule 0.52 g  1 capsule Oral Daily Iver Kemps, CNP   0.52 g at 01/01/18 0825    pantoprazole (PROTONIX) tablet 40 mg  40 mg Oral Nightly Iver Kemps, CNP   40 mg at 01/01/18 2004    sodium chloride flush 0.9 % injection 10 mL  10 mL Intravenous 2 times per day Iver Kemps, CNP   10 mL at 12/31/17 0846    sodium chloride flush 0.9 % injection 10 mL  10 mL Intravenous PRN Iver Kemps, CNP   10 mL at 12/31/17 0204    acetaminophen (TYLENOL) tablet 650 mg  650 mg Oral Q4H PRN Iver Kemps, CNP   650 mg at 12/30/17 0754    ondansetron (ZOFRAN) injection 4 mg  4 mg Intravenous Q6H PRN Iver Kemps, CNP   4 mg at 12/30/17 1422    meclizine (ANTIVERT) tablet 25 mg  25 mg Oral 4 times per day Nora Titus CNP   25 mg at 01/01/18 2761    hydrALAZINE (APRESOLINE) injection 5 mg  5 mg Intravenous Q6H PRN Jalyn Lovell CNP   5 mg at 12/28/17 2238    nystatin (MYCOSTATIN) 823074 UNIT/ML suspension 500,000 Units  5 mL Oral 4x Daily Jalyn Lovell CNP   500,000 Units at 01/01/18 2004     Allergies   Allergen Reactions    Other      Patient states \"Alergic to Calcium\"     Reclast [Zoledronic Acid]      Principal Problem:    Near syncope  Active Problems:    Hypertension    Vertigo    Blood pressure (!) 168/74, pulse 72, temperature 96.6 °F (35.9 °C), temperature source Oral, resp. rate 16, height 5' 2\" (1.575 m), weight 114 lb 11 oz (52 kg), SpO2 96 %, not currently breastfeeding. Subjective Patient is doing well. The patient has no complaints. Objective  Patient is well appearing in no acute distress. The patient is breathing easily, heart is RRR. The patient's lower extremities have a full ROM and no edema.     Assessment & Plan  Near syncope - doing better  Hyperkalemia - patient took kayexlate this evening and another dose in the morning, recheck labs in the morning    Lexa Salcedo PA-C  1/1/2018

## 2018-01-02 NOTE — PROGRESS NOTES
Hospitalist Progress Note    Patient:  Cammy Villegas     YOB: 1927    MRN: [id number]   Admit date: 12/28/2017     Acct: [de-identified]     PCP: Joann Medellin MD    CC--Interval History:   Near syncope--vertigo--resolved    UTI--POA---completed Rocephin IV    Oral thrush---cont'd nystatin    Hyperkalemia---corrected--will need recheck potassium level     Kidney disease----chronic--Stage 3    To [facility]. 2.2018    All other ROS negative except noted in HPI    Diet:  DIET CARDIAC; Low Sodium (2 GM);  No Caffeine    Medications:  Scheduled Meds:   sodium polystyrene  15 g Oral Once    ciprofloxacin  200 mg Intravenous Q12H    lactobacillus  1 capsule Oral TID    amiodarone  200 mg Oral Daily    vitamin B-12  1,000 mcg Oral Daily    docusate sodium  100 mg Oral BID    donepezil  10 mg Oral Nightly    ferrous sulfate  325 mg Oral BID WC    psyllium  1 capsule Oral Daily    pantoprazole  40 mg Oral Nightly    sodium chloride flush  10 mL Intravenous 2 times per day    meclizine  25 mg Oral 4 times per day    nystatin  5 mL Oral 4x Daily     Continuous Infusions:   sodium chloride 75 mL/hr at 01/02/18 0205     PRN Meds:sodium chloride flush, acetaminophen, ondansetron, hydrALAZINE    Objective:  Labs:  CBC with Differential:    Lab Results   Component Value Date    WBC 6.2 01/02/2018    RBC 2.93 01/02/2018    HGB 9.0 01/02/2018    HCT 27.8 01/02/2018     01/02/2018    MCV 94.6 01/02/2018    MCH 30.6 01/02/2018    MCHC 32.4 01/02/2018    RDW 14.4 01/02/2018    LYMPHOPCT 26 01/02/2018    MONOPCT 12 01/02/2018    BASOPCT 1 01/02/2018    MONOSABS 0.70 01/02/2018    LYMPHSABS 1.60 01/02/2018    EOSABS 0.20 01/02/2018    BASOSABS 0.10 01/02/2018    DIFFTYPE NOT REPORTED 01/02/2018     BMP:    Lab Results   Component Value Date     01/02/2018    K 4.2 01/02/2018     01/02/2018    CO2 26 01/02/2018    BUN 23 01/02/2018    LABALBU 3.7 12/28/2017    CREATININE 1.09 01/02/2018 CALCIUM 8.5 01/02/2018    GFRAA 57 01/02/2018    LABGLOM 47 01/02/2018    GLUCOSE 87 01/02/2018           Physical Exam:  Vitals: BP (!) 152/65   Pulse 70   Temp 98 °F (36.7 °C) (Oral)   Resp 16   Ht 5' 2\" (1.575 m)   Wt 114 lb 11 oz (52 kg)   SpO2 94%   BMI 20.98 kg/m²   24 hour intake/output:  Intake/Output Summary (Last 24 hours) at 01/02/18 1217  Last data filed at 01/02/18 0740   Gross per 24 hour   Intake             2288 ml   Output             1300 ml   Net              988 ml     Last 3 weights: Wt Readings from Last 3 Encounters:   01/01/18 114 lb 11 oz (52 kg)   12/28/17 112 lb 6.4 oz (51 kg)   12/21/17 118 lb (53.5 kg)     HEENT: Normocephalic and Atraumatic  Neck: Supple, No Masses, Tenderness, Nodularity and No Lymphadenopathy  Chest/Lungs: Clear to Auscultation without Rales, Rhonchi, or Wheezes  Cardiac: Irregularly Irregular  GI/Abdomen:  Bowel Sounds Present and Soft, Non-tender, without Guarding or Rebound Tenderness  : Not examined  EXT/Skin: No Edema, No Cyanosis and No Clubbing  Neuro: alert---dementia = baseline---wobbly gait      Assessment:    Principal Problem:    Near syncope  Active Problems:    Hypertension    Vertigo    MIQUEL ALMONTE md          dc    FP  90  WF  [ge Lennis Sprain, FP---parisa melendrez]  DNR-CCA     AMIODARONE    Anti-infectives:  nystatin swish-swallow, Rocephin IVcourse completed     Near syncope12.28.2017  Vertigo  Hyperkalemia   Oral thrush---POA---12.28.2017    UTI-POA---12.28.2018       ASCVD           CABG x 42006  Hypotensive episodes   Orthostasis   Atrial fibrillationchronic    Cerebrovascular disease           CVA---history  Hypertension  Hyperlipidemia  Diabetes Mellitus Type 2  Kidney disease---chronic----Stage  3   GERD  Anemia----chronic   Dementia   PMH:  vasomotor rhinitis, atrophic vaginitis, osteoporosis, adenocarcinoma             atypical hyperplasia---site?, fatigue, Herpes zoster, night terrors  PSH:   left cataract

## 2018-01-02 NOTE — CARE COORDINATION
Writer spoke with patient regarding discharge planning. Continues to plan for discharge to [facility] for rehab. Instructed that writer will be available if any questions should arise, voiced understanding, denies needs at this time.

## 2018-01-02 NOTE — PROGRESS NOTES
Occupational Therapy   Occupational Therapy Initial Assessment  Date: 2018   Patient Name: Eric Brown  MRN: [id number]     : 1927    Patient Diagnosis(es): The primary encounter diagnosis was Near syncope. Diagnoses of Other fatigue and Left maxillary sinusitis were also pertinent to this visit. has a past medical history of [facility] Anemia; Atrophic vaginitis; Chronic atrial fibrillation (HCC); CKD (chronic kidney disease), stage III; Coronary artery disease; Dementia; Diabetes mellitus, type 2 ([facility] Diverticulosis; Fatigue; GERD (gastroesophageal reflux disease); GI bleed; Heartburn; Herpes zoster; Hypercholesterolemia; Hypertension; Hypotensive episode; Left cataract; Night terrors, adult; Orthostasis; Osteoporosis; Renal disease; Stroke [facility]); Vasomotor rhinitis; and Vertigo. has a past surgical history that includes other surgical history (); Cataract removal with implant (Left); re and bso (cervix removed) (); cyst removal (1979); Dilation and curettage of uterus (1977);  section; Colonoscopy (2006); other surgical history (10/24/2012); and Coronary artery bypass graft.     Treatment Diagnosis: weakness      Restrictions  Restrictions/Precautions  Restrictions/Precautions: General Precautions, Fall Risk    Subjective   General  Chart Reviewed: Yes  Patient assessed for rehabilitation services?: Yes  Family / Caregiver Present: Yes  Referring Practitioner: Kayley Fitzpatrick  Diagnosis: near syncope  Subjective  Subjective: Patient rec'd in chair, pleasant and cooperative 80 yr old female with fatigue and weakness  Pain Assessment  Patient Currently in Pain: Denies  Pain Assessment: 0-10  Pain Level: 0  Pain Type: Chronic pain  Pain Location: Hip  Pain Orientation: Left  Pain Descriptors: Aching  Pain Frequency: Continuous  Clinical Progression: Not changed  Patient's Stated Pain Goal: No pain  Pain Intervention(s): Medication (see eMar)  Multiple Pain Sites: No     Social/Functional History  Social/Functional History  Lives With: Family  Type of Home: House  Home Equipment: Rolling walker  Receives Help From: Family  ADL Assistance: Needs assistance  Bath: Minimal assistance  Homemaking Assistance: Needs assistance  Homemaking Responsibilities: No  Ambulation Assistance: Independent  Transfer Assistance: Independent  Active : No  Patient's  Info: family  Occupation: Retired       Objective   Vision: Impaired  Vision Exceptions: Wears glasses at all times  Hearing: Exceptions to [facility]  Hearing Exceptions: Hard of hearing/hearing concerns    Orientation  Overall Orientation Status: Impaired  Orientation Level: Oriented to person     Standing Balance  Sit to stand: Contact guard assistance  Stand to sit: Contact guard assistance  ADL  LE Dressing: Stand by assistance  Toileting: Contact guard assistance  Transfers  Sit to stand: Contact guard assistance  Stand to sit: Contact guard assistance  Cognition  Overall Cognitive Status: Exceptions  Following Commands: Follows one step commands with increased time; Follows one step commands with repetition  Memory: Decreased recall of recent events  Safety Judgement: Decreased awareness of need for assistance;Decreased awareness of need for safety  Insights: Decreased awareness of deficits  LUE AROM (degrees)  LUE AROM : WFL  Left Hand AROM (degrees)  Left Hand AROM: WFL  RUE AROM (degrees)  RUE AROM : WFL  Right Hand AROM (degrees)  Right Hand AROM: WFL  LUE Strength  L Shoulder Flex: 3/5  L Shoulder Ext: 3/5  L Shoulder Ext Rotation: 3/5  L Shoulder Horiz ABduction: 3/5  L Shoulder Horiz ADduction: 3/5  L Elbow Ext: 3/5  L Hand Grasp: 3/5  RUE Strength  R Shoulder Flex: 3/5  R Shoulder Ext: 3/5  R Shoulder Int Rotation: 3/5  R Shoulder Ext Rotation: 3/5  R Elbow Flex: 3/5  R Elbow Ext: 3/5  R Hand Grasp: 3/5     Assessment   Performance deficits / Impairments: Decreased functional mobility ; Decreased ADL status; Decreased strength;Decreased cognition;Decreased endurance;Decreased balance;Decreased high-level IADLs;Decreased safe awareness  Treatment Diagnosis: weakness  Prognosis: Fair  Decision Making: Medium Complexity  Discharge Recommendations: ECF with OT  REQUIRES OT FOLLOW UP: Yes  Safety Devices  Safety Devices in place: Yes  Type of devices: Chair alarm in place; Left in chair;Patient at risk for falls;Call light within reach        Discharge Recommendations:  ECF with OT     Plan   Plan  Times per week: 2    G-Code  OT G-codes  Functional Limitation: Self care  Self Care Current Status (): At least 20 percent but less than 40 percent impaired, limited or restricted  Self Care Goal Status (): At least 20 percent but less than 40 percent impaired, limited or restricted  Self Care Discharge Status ():  At least 20 percent but less than 40 percent impaired, limited or restricted  OutComes Score     AM-PAC Score    Goals  Short term goals  Time Frame for Short term goals: 2 days  Short term goal 1: Patient to complete toilet transfer and toileting tasks with S using a/e as needed with fair + safety       Therapy Time   Individual Concurrent Group Co-treatment   Time In 1057         Time Out 1112         Minutes 15         Timed Code Treatment Minutes: 0 Minutes       ALEKSANDER QUILES OTR, OT

## 2018-01-03 NOTE — DISCHARGE SUMMARY
chart and/or  dictation will be addressed and treated as an outpatient. The patient's medications have been reviewed including, but not limited to,  pre-hospital, hospital and discharge medications. The patient and/or the  patient's personal representatives were specifically advised the only  medications to be taken are those set forth in the discharge orders and no  other medications should be taken. Any prior medications not on the  discharge orders are specifically discontinued. The patient has been advised of the financial relationship and ownership  interests between [facility] physicians and employees  of [facility] and [facility]. Basic metabolic panel will be drawn on 01/03/2018 and 01/05/2018.         Gavin Munson    D: 01/02/2018 17:16:30       T: 01/02/2018 23:32:16     [email or web address]  Job#: [id number]     Doc#: [id number]    CC:

## 2018-01-08 ENCOUNTER — CARE COORDINATION (OUTPATIENT)
Dept: CASE MANAGEMENT | Age: 83
End: 2018-01-08

## 2018-01-08 NOTE — CARE COORDINATION
Called skilled nursing facility [address] [place] [address] for a patient update. Nurse Keena just arrived for start of her shift reports patient is doing okay, no acute concerns. This nurse transferred to therapy. Cayden Vines reports patient is min assist for bed mobility, CGA for transfers and ambulation, distance 225 ft using a rolling walker. ADL's grooming set up seated, upper body dressing min assist, lower body mod assist, toileting min assist, bathing mod assist. Discharge date TBD. Post acute transition coordinator will continue to follow.  Eliz Irby, JOMARN  [facility] Transition Coordinator  [phone]